# Patient Record
Sex: FEMALE | Race: BLACK OR AFRICAN AMERICAN | NOT HISPANIC OR LATINO | Employment: OTHER | URBAN - METROPOLITAN AREA
[De-identification: names, ages, dates, MRNs, and addresses within clinical notes are randomized per-mention and may not be internally consistent; named-entity substitution may affect disease eponyms.]

---

## 2023-12-14 DIAGNOSIS — M25.551 PAIN OF RIGHT HIP: Primary | ICD-10-CM

## 2023-12-15 ENCOUNTER — OFFICE VISIT (OUTPATIENT)
Dept: ORTHOPEDICS | Facility: CLINIC | Age: 73
End: 2023-12-15
Payer: MEDICARE

## 2023-12-15 ENCOUNTER — HOSPITAL ENCOUNTER (OUTPATIENT)
Dept: RADIOLOGY | Facility: HOSPITAL | Age: 73
Discharge: HOME | End: 2023-12-15
Attending: ORTHOPAEDIC SURGERY
Payer: MEDICARE

## 2023-12-15 DIAGNOSIS — M16.11 PRIMARY OSTEOARTHRITIS OF RIGHT HIP: Primary | ICD-10-CM

## 2023-12-15 DIAGNOSIS — M25.551 PAIN OF RIGHT HIP: ICD-10-CM

## 2023-12-15 PROCEDURE — 99999 PR PBB SHADOW E&M-EST. PATIENT-LVL III: ICD-10-PCS | Mod: PBBFAC,,, | Performed by: ORTHOPAEDIC SURGERY

## 2023-12-15 PROCEDURE — 1125F PR PAIN SEVERITY QUANTIFIED, PAIN PRESENT: ICD-10-PCS | Mod: CPTII,S$GLB,, | Performed by: ORTHOPAEDIC SURGERY

## 2023-12-15 PROCEDURE — 73521 XR HIPS BILATERAL 2 VIEW INCL AP PELVIS: ICD-10-PCS | Mod: 26,,, | Performed by: RADIOLOGY

## 2023-12-15 PROCEDURE — 3288F FALL RISK ASSESSMENT DOCD: CPT | Mod: CPTII,S$GLB,, | Performed by: ORTHOPAEDIC SURGERY

## 2023-12-15 PROCEDURE — 1159F PR MEDICATION LIST DOCUMENTED IN MEDICAL RECORD: ICD-10-PCS | Mod: CPTII,S$GLB,, | Performed by: ORTHOPAEDIC SURGERY

## 2023-12-15 PROCEDURE — 1101F PR PT FALLS ASSESS DOC 0-1 FALLS W/OUT INJ PAST YR: ICD-10-PCS | Mod: CPTII,S$GLB,, | Performed by: ORTHOPAEDIC SURGERY

## 2023-12-15 PROCEDURE — 73521 X-RAY EXAM HIPS BI 2 VIEWS: CPT | Mod: TC,PN

## 2023-12-15 PROCEDURE — 99999 PR PBB SHADOW E&M-EST. PATIENT-LVL III: CPT | Mod: PBBFAC,,, | Performed by: ORTHOPAEDIC SURGERY

## 2023-12-15 PROCEDURE — 3288F PR FALLS RISK ASSESSMENT DOCUMENTED: ICD-10-PCS | Mod: CPTII,S$GLB,, | Performed by: ORTHOPAEDIC SURGERY

## 2023-12-15 PROCEDURE — 1160F PR REVIEW ALL MEDS BY PRESCRIBER/CLIN PHARMACIST DOCUMENTED: ICD-10-PCS | Mod: CPTII,S$GLB,, | Performed by: ORTHOPAEDIC SURGERY

## 2023-12-15 PROCEDURE — 1101F PT FALLS ASSESS-DOCD LE1/YR: CPT | Mod: CPTII,S$GLB,, | Performed by: ORTHOPAEDIC SURGERY

## 2023-12-15 PROCEDURE — 1159F MED LIST DOCD IN RCRD: CPT | Mod: CPTII,S$GLB,, | Performed by: ORTHOPAEDIC SURGERY

## 2023-12-15 PROCEDURE — 99203 PR OFFICE/OUTPT VISIT, NEW, LEVL III, 30-44 MIN: ICD-10-PCS | Mod: S$GLB,,, | Performed by: ORTHOPAEDIC SURGERY

## 2023-12-15 PROCEDURE — 1160F RVW MEDS BY RX/DR IN RCRD: CPT | Mod: CPTII,S$GLB,, | Performed by: ORTHOPAEDIC SURGERY

## 2023-12-15 PROCEDURE — 73521 X-RAY EXAM HIPS BI 2 VIEWS: CPT | Mod: 26,,, | Performed by: RADIOLOGY

## 2023-12-15 PROCEDURE — 1125F AMNT PAIN NOTED PAIN PRSNT: CPT | Mod: CPTII,S$GLB,, | Performed by: ORTHOPAEDIC SURGERY

## 2023-12-15 PROCEDURE — 99203 OFFICE O/P NEW LOW 30 MIN: CPT | Mod: S$GLB,,, | Performed by: ORTHOPAEDIC SURGERY

## 2023-12-15 RX ORDER — ALBUTEROL SULFATE 90 UG/1
2 AEROSOL, METERED RESPIRATORY (INHALATION)
COMMUNITY
Start: 2023-10-24

## 2023-12-15 RX ORDER — AZELASTINE 1 MG/ML
1 SPRAY, METERED NASAL
COMMUNITY
Start: 2023-03-06 | End: 2024-03-05

## 2023-12-15 RX ORDER — METHYLPREDNISOLONE 4 MG/1
TABLET ORAL
Qty: 1 EACH | Refills: 1 | Status: SHIPPED | OUTPATIENT
Start: 2023-12-15

## 2023-12-15 RX ORDER — FAMOTIDINE 20 MG/1
20 TABLET, FILM COATED ORAL
COMMUNITY
Start: 2023-07-10

## 2023-12-15 RX ORDER — LEVOCETIRIZINE DIHYDROCHLORIDE 5 MG/1
10 TABLET, FILM COATED ORAL
COMMUNITY

## 2023-12-15 RX ORDER — DICLOFENAC SODIUM 75 MG/1
75 TABLET, DELAYED RELEASE ORAL 2 TIMES DAILY
COMMUNITY

## 2023-12-15 RX ORDER — ERGOCALCIFEROL 1.25 MG/1
50000 CAPSULE ORAL
COMMUNITY

## 2023-12-15 RX ORDER — IBUPROFEN 800 MG/1
800 TABLET ORAL EVERY 6 HOURS PRN
COMMUNITY

## 2023-12-15 RX ORDER — IBUPROFEN 100 MG/5ML
1000 SUSPENSION, ORAL (FINAL DOSE FORM) ORAL
COMMUNITY

## 2023-12-15 RX ORDER — ROSUVASTATIN CALCIUM 10 MG/1
10 TABLET, COATED ORAL
COMMUNITY
Start: 2023-10-09

## 2023-12-15 RX ORDER — PREGABALIN 75 MG/1
75 CAPSULE ORAL 2 TIMES DAILY
COMMUNITY

## 2023-12-15 RX ORDER — AMLODIPINE BESYLATE 5 MG/1
1 TABLET ORAL DAILY
COMMUNITY
Start: 2023-08-30 | End: 2024-08-29

## 2023-12-15 RX ORDER — ALENDRONATE SODIUM 70 MG/1
70 TABLET ORAL
COMMUNITY

## 2023-12-15 RX ORDER — MYCOPHENOLATE MOFETIL 500 MG/1
TABLET ORAL
COMMUNITY

## 2023-12-15 RX ORDER — OLMESARTAN MEDOXOMIL AND HYDROCHLOROTHIAZIDE 40/25 40; 25 MG/1; MG/1
1 TABLET ORAL
COMMUNITY

## 2023-12-15 NOTE — PROGRESS NOTES
Subjective:      Patient ID: Chinyere Najera is a 73 y.o. female.    Chief Complaint: Pain of the Right Hip and Consult    HPI      Chinyere Najera is seen for evaluation and treatment of hip pain.  They have experienced problems with their right hip over the past  several years Pain is located in the groin and  referred to the knee. They have been treated with  intra-articular injection and NSAIDS.   Symptoms have recently worsened. Ambulation reportedly has been impaired. Self care ADLs are painful.    The patient is also undergoing a workup for possible lumbar nerve root compression      The patient reports that over the years she has had multiple intra-articular injections .  The patient states that in past years injections were very beneficial, but the most recent injections done last month was not helpful.    Review of Systems   Constitutional: Negative for fever and weight loss.   HENT:  Negative for congestion.    Eyes:  Negative for visual disturbance.   Cardiovascular:  Negative for chest pain.   Respiratory:  Negative for shortness of breath.    Hematologic/Lymphatic: Negative for bleeding problem. Does not bruise/bleed easily.   Skin:  Negative for poor wound healing.   Musculoskeletal:  Positive for joint pain.   Gastrointestinal:  Negative for abdominal pain.   Genitourinary:  Negative for dysuria.   Neurological:  Negative for seizures.   Psychiatric/Behavioral:  Negative for altered mental status.    Allergic/Immunologic: Negative for persistent infections.         Objective:            Ortho/SPM Exam       Right hip     The patient is not in acute distress.   Body habitus is: avoid.   Sclerae  normal  The patient walks with a limp.   Respiratory distress:  none  The skin over the hip is:intact.   There is:no local tenderness.   Range of motion- Flexion  85, External rotation  25, internal rotation  10.  Resisted SLR positive.  Pain with rotation positive  Sciatic tension findings  negative.  Shortening/lengthening compared to the contralateral side exam deferred.  Pulses DP present, PT present.  Motor normal 5/5 strength in all tested muscle groups.   Sensory normal.    IMAGING-  right hip radiographs show near complete loss of joint space superiorly with sclerosis and cysts              Assessment:       Encounter Diagnosis   Name Primary?    Primary osteoarthritis of right hip Yes             I can not rule out the possibility that the patient has lupus placed some role in this condition were that she even might have a degree of avascular necrosis.     Although I can not rule out some degree of symptomatology from lumbar disc degeneration and nerve root compression, the patient shows compelling, objective evidence of progressive  degeneration of the right hip        Plan:       Chinyere was seen today for pain and consult.    Diagnoses and all orders for this visit:    Primary osteoarthritis of right hip           I explained my diagnostic impression and the reasoning behind it in detail, using layman's terms.  Models and/or pictures were used to help in the explanation.     Treatment options discussed included reinjection, palliation with oral corticosteroids and consideration of arthroplasty.  The pros and cons of each were discussed.      For now I recommend a Medrol Dosepak for palliation over the holiday season.      Depending on response, injection might be reconsidered.      I explained the potential role of surgery in the treatment of this condition to the patient.  They understand that if nonsurgical measures do not adequately control symptoms, surgery will be considered in the future.      I gave the patient information on our [facility] web site for patient education pertaining to hip replacement

## 2024-01-25 ENCOUNTER — TELEPHONE (OUTPATIENT)
Dept: ORTHOPEDICS | Facility: CLINIC | Age: 74
End: 2024-01-25

## 2024-01-25 ENCOUNTER — OFFICE VISIT (OUTPATIENT)
Dept: ORTHOPEDICS | Facility: CLINIC | Age: 74
End: 2024-01-25
Payer: MEDICARE

## 2024-01-25 VITALS — BODY MASS INDEX: 39.09 KG/M2 | HEIGHT: 64 IN | WEIGHT: 229 LBS

## 2024-01-25 DIAGNOSIS — M16.11 PRIMARY OSTEOARTHRITIS OF RIGHT HIP: Primary | ICD-10-CM

## 2024-01-25 PROCEDURE — 99213 OFFICE O/P EST LOW 20 MIN: CPT | Mod: S$GLB,,, | Performed by: ORTHOPAEDIC SURGERY

## 2024-01-25 PROCEDURE — 99999 PR PBB SHADOW E&M-EST. PATIENT-LVL IV: CPT | Mod: PBBFAC,,, | Performed by: ORTHOPAEDIC SURGERY

## 2024-01-25 NOTE — PROGRESS NOTES
Subjective:      Patient ID: Chinyere Najera is a 73 y.o. female.    Chief Complaint: Pain of the Right Hip    HPI    Follow-up for right hip pain.  The patient tried a Medrol Dosepak without much benefit.  She also reports that her last injection was not helpful.  She has mainly groin pain aggravated by prolonged walking.  She denies severe disability this time and is interested in trying physiotherapy          Review of Systems   Constitutional: Negative for fever and weight loss.   HENT:  Negative for congestion.    Eyes:  Negative for visual disturbance.   Cardiovascular:  Negative for chest pain.   Respiratory:  Negative for shortness of breath.    Hematologic/Lymphatic: Negative for bleeding problem. Does not bruise/bleed easily.   Skin:  Negative for poor wound healing.   Musculoskeletal:  Positive for joint pain.   Gastrointestinal:  Negative for abdominal pain.   Genitourinary:  Negative for dysuria.   Neurological:  Negative for seizures.   Psychiatric/Behavioral:  Negative for altered mental status.    Allergic/Immunologic: Negative for persistent infections.       Objective:      Ortho/SPM Exam      Right hip     The patient is not in acute distress.   Body habitus is:  Overweight  Sclerae  normal  The patient walks with a limp.   Respiratory distress:  none  The skin over the hip is:intact.   There is:no local tenderness.   Range of motion- Flexion  85, External rotation  25, internal rotation  10.  Resisted SLR positive.  Pain with rotation positive  Sciatic tension findings negative.  Shortening/lengthening compared to the contralateral side exam deferred.  Pulses DP present, PT present.  Motor normal 5/5 strength in all tested muscle groups.   Sensory normal.      Previous radiographs show advanced degeneration of the right hip joint space.  Recent lumbar MRI shows degenerative disc changes with mild lateral recess stenosis        Assessment:       Encounter Diagnosis   Name Primary?    Primary  osteoarthritis of right hip Yes        Although some of the symptomatology may be related to lumbar degenerative disc changes, overall evaluation strongly suggest that the right hip is the source of most of the patient's mobility impairment and pain with walking.          Plan:       Chinyere was seen today for pain.    Diagnoses and all orders for this visit:    Primary osteoarthritis of right hip  -     Ambulatory referral/consult to Physical/Occupational Therapy; Future          I explained my diagnostic impression and the reasoning behind it in detail, using layman's terms.  Models and/or pictures were used to help in the explanation.    Physiotherapy referral supply per patient request.      The patient plans to go on a cruise in April.  I will consider another palliative injection before she does this.    I explained the potential role of surgery in the treatment of this condition to the patient.  They understand that if nonsurgical measures do not adequately control symptoms, surgery will be considered in the future.

## 2024-03-20 ENCOUNTER — TELEPHONE (OUTPATIENT)
Dept: ORTHOPEDICS | Facility: CLINIC | Age: 74
End: 2024-03-20
Payer: MEDICARE

## 2024-03-20 NOTE — TELEPHONE ENCOUNTER
Left message to contact clinic in regards to her 03/28 appointment. MD will be out- appointment has been r/s with PA on 03/27at 11:00

## 2024-03-26 NOTE — PROGRESS NOTES
Subjective:      Patient ID: Chinyere Najera is a 73 y.o. female.    Chief Complaint: Follow-up of the Right Hip      HPI: Chinyere Najera is a 73 y.o. female who presents to clinic for follow up of ongoing right hip pain related to osteoarthritis.  The patient notes continued groin pain and difficulties with ambulation.  She would like to discuss prescription of an assist device today.  Additionally, patient would like to explore repeat palliative hip injection as previously discussed.  She denies any new injuries or symptoms.  She is having increasing difficulties with ADLs.    01/25/2024 PRAVIN FU: Follow-up for right hip pain.  The patient tried a Medrol Dosepak without much benefit.  She also reports that her last injection was not helpful.  She has mainly groin pain aggravated by prolonged walking.  She denies severe disability this time and is interested in trying physiotherapy  The patient plans to go on a cruise in April. I will consider another palliative injection before she does this.       PAST MEDICAL HISTORY:    Past Medical History:   Diagnosis Date    Arthritis     GERD (gastroesophageal reflux disease)     HTN (hypertension)     Lupus     Vertigo      MEDICATIONS:   Current Outpatient Medications:     albuterol (PROVENTIL/VENTOLIN HFA) 90 mcg/actuation inhaler, Inhale 2 puffs into the lungs., Disp: , Rfl:     alendronate (FOSAMAX) 70 MG tablet, Take 70 mg by mouth every 7 days., Disp: , Rfl:     amLODIPine (NORVASC) 5 MG tablet, Take 1 tablet by mouth once daily., Disp: , Rfl:     ascorbic acid, vitamin C, (VITAMIN C) 1000 MG tablet, Take 1,000 mg by mouth., Disp: , Rfl:     aspirin (ECOTRIN) 81 MG EC tablet, Take 81 mg by mouth once daily., Disp: , Rfl:     azelastine (ASTELIN) 137 mcg (0.1 %) nasal spray, 1 spray by Nasal route., Disp: , Rfl:     azilsartan med-chlorthalidone (EDARBYCLOR) 40-12.5 mg Tab, Take by mouth once daily., Disp: , Rfl:     calcium-vitamin D3 (CALCIUM 500 + D) 500 mg(1,250mg) -200  unit per tablet, Take by mouth once daily., Disp: , Rfl:     diclofenac (VOLTAREN) 75 MG EC tablet, Take 75 mg by mouth 2 (two) times daily., Disp: , Rfl:     ergocalciferol (ERGOCALCIFEROL) 50,000 unit Cap, Take 50,000 Units by mouth., Disp: , Rfl:     esomeprazole (NEXIUM) 40 MG capsule, Take 40 mg by mouth before breakfast., Disp: , Rfl:     famotidine (PEPCID) 20 MG tablet, Take 20 mg by mouth., Disp: , Rfl:     fexofenadine (ALLEGRA) 180 MG tablet, Take 180 mg by mouth once daily., Disp: , Rfl:     ibuprofen (ADVIL,MOTRIN) 800 MG tablet, Take 800 mg by mouth every 6 (six) hours as needed for Pain., Disp: , Rfl:     levocetirizine (XYZAL) 5 MG tablet, Take 10 mg by mouth., Disp: , Rfl:     methylPREDNISolone (MEDROL DOSEPACK) 4 mg tablet, use as directed, Disp: 1 each, Rfl: 1    mycophenolate (CELLCEPT) 500 mg Tab, Take by mouth., Disp: , Rfl:     olmesartan-hydrochlorothiazide (BENICAR HCT) 40-25 mg per tablet, Take 1 tablet by mouth., Disp: , Rfl:     potassium chloride (MICRO-K) 10 MEQ CpSR, Take 10 mEq by mouth once daily., Disp: , Rfl:     pregabalin (LYRICA) 75 MG capsule, Take 75 mg by mouth 2 (two) times daily., Disp: , Rfl:     rosuvastatin (CRESTOR) 10 MG tablet, Take 10 mg by mouth., Disp: , Rfl:     ALLERGIES:   Review of patient's allergies indicates:  No Known Allergies    Review of Systems:  Constitution: Negative for chills, fever and night sweats.   HENT: Negative for congestion and headaches.    Eyes: Negative for blurred vision or vision loss.  Cardiovascular: Negative for chest pain and syncope.   Respiratory: Negative for cough and shortness of breath.    Endocrine: Negative for polydipsia, polyphagia and polyuria.   Hematologic/Lymphatic: Negative for bleeding problem. Does not bruise/bleed easily.   Skin: Negative for dry skin, itching and rash.   Musculoskeletal: See HPI.   Gastrointestinal: Negative for abdominal pain and bowel incontinence.   Genitourinary: Negative for bladder  incontinence and nocturia.   Neurological: Negative for disturbances in coordination, loss of balance and seizures.   Psychiatric/Behavioral: Negative for depression. The patient does not have insomnia.    Allergic/Immunologic: Negative for hives and persistent infections.          Objective:      There were no vitals filed for this visit.    PHYSICAL EXAM:  General: Alert & oriented x3, well-developed and well-nourished, in no acute distress, sitting comfortably in the exam room.    RIGHT HIP:        Body habitus is:   overweight .         The patient walks with a limp.         The skin over the hip is intact.         Tenderness:  no local tenderness.        Range of Motion:    Flexion:  85°  External Rotation:  25°  Internal Rotation:  10°        Pain with rotation:  positive        Resisted SLR:  positive.        Sciatic tension findings:  negative.        Pulses DP present, PT present.        Motor:  normal 5/5 strength in all tested muscle groups.         Sensory:  normal.      Imaging:   X-Rays: 3 views of bilateral hips dated 12/15/2023 , and independently reviewed, show: Advanced degenerative changes of the right hip joint space.       MRI Lumbar Spine (dated: 12/20/2023):   Disc bulge with degenerative changes at all lumbar vertebrae.  Spondylotic changes lumbar spine greatest at L4-L5.        Assessment:       1. Primary osteoarthritis of right hip         Plan:       Orders Placed This Encounter    CANE FOR HOME USE    Ambulatory referral/consult to Pain Clinic     The patient's right hip pain continues to worsen despite conservative treatment with anti-inflammatories, physiotherapy and activity modification.    We again reviewed the anatomy and pathophysiology of arthritis, which is likely to continue to worsen over time.    Pain control:  Order for repeat hip injection with interventional pain management prior to scheduled cruise in a few weeks    DME: cane for ambulation assistance    Follow-up: 3  months    We again reviewed the surgical procedure of total hip arthroplasty, and I was able to answer several questions at the bedside and present hip models for review.  Patient will consider total hip arthroplasty in the future.

## 2024-03-27 ENCOUNTER — OFFICE VISIT (OUTPATIENT)
Dept: ORTHOPEDICS | Facility: CLINIC | Age: 74
End: 2024-03-27
Payer: MEDICARE

## 2024-03-27 VITALS — HEIGHT: 64 IN | WEIGHT: 229.25 LBS | BODY MASS INDEX: 39.14 KG/M2

## 2024-03-27 DIAGNOSIS — M16.11 PRIMARY OSTEOARTHRITIS OF RIGHT HIP: Primary | ICD-10-CM

## 2024-03-27 PROCEDURE — 99999 PR PBB SHADOW E&M-EST. PATIENT-LVL IV: CPT | Mod: PBBFAC,,, | Performed by: PHYSICIAN ASSISTANT

## 2024-03-27 PROCEDURE — 99213 OFFICE O/P EST LOW 20 MIN: CPT | Mod: S$GLB,,, | Performed by: PHYSICIAN ASSISTANT

## 2024-03-28 ENCOUNTER — TELEPHONE (OUTPATIENT)
Dept: ORTHOPEDICS | Facility: CLINIC | Age: 74
End: 2024-03-28
Payer: MEDICARE

## 2024-03-28 ENCOUNTER — TELEPHONE (OUTPATIENT)
Dept: PAIN MEDICINE | Facility: CLINIC | Age: 74
End: 2024-03-28
Payer: MEDICARE

## 2024-03-28 NOTE — TELEPHONE ENCOUNTER
----- Message from Conchita Kumar sent at 3/28/2024 11:10 AM CDT -----  Pt Requesting Call Back    Who called: pt  Who called for pt:  Best call back #:  [phone]  Add notes: pt said someone reached out to her from the pain management dept and that she does not need to see anyone in that department; pt needs clarity on why she was referred there

## 2024-03-28 NOTE — TELEPHONE ENCOUNTER
Spoke with pt. Pt agreed on date and time for a new pt visit with Jen. No further concerns was expressed. Call ended.----- Message from Tana De sent at 3/28/2024 12:38 PM CDT -----  Regarding: pt returning call  Name of Who is Calling: VIJAY CONNER [[id number]]      What is the request in detail: pt stated she received a call back from a Narcisa to be scheduled for her IR injection. Referral is in the system . Please advise       Can the clinic reply by [facility]: No       [facility] Telephone Information:           [phone]

## 2024-04-02 ENCOUNTER — TELEPHONE (OUTPATIENT)
Dept: PAIN MEDICINE | Facility: CLINIC | Age: 74
End: 2024-04-02
Payer: MEDICARE

## 2024-04-02 ENCOUNTER — OFFICE VISIT (OUTPATIENT)
Dept: PAIN MEDICINE | Facility: CLINIC | Age: 74
End: 2024-04-02
Payer: MEDICARE

## 2024-04-02 VITALS
WEIGHT: 229.38 LBS | BODY MASS INDEX: 39.16 KG/M2 | DIASTOLIC BLOOD PRESSURE: 81 MMHG | HEART RATE: 72 BPM | HEIGHT: 64 IN | SYSTOLIC BLOOD PRESSURE: 138 MMHG

## 2024-04-02 DIAGNOSIS — M25.551 RIGHT HIP PAIN: ICD-10-CM

## 2024-04-02 DIAGNOSIS — M16.11 PRIMARY OSTEOARTHRITIS OF RIGHT HIP: Primary | ICD-10-CM

## 2024-04-02 DIAGNOSIS — M47.816 LUMBAR SPONDYLOSIS: ICD-10-CM

## 2024-04-02 DIAGNOSIS — M51.36 DDD (DEGENERATIVE DISC DISEASE), LUMBAR: ICD-10-CM

## 2024-04-02 PROCEDURE — 3075F SYST BP GE 130 - 139MM HG: CPT | Mod: CPTII,S$GLB,, | Performed by: STUDENT IN AN ORGANIZED HEALTH CARE EDUCATION/TRAINING PROGRAM

## 2024-04-02 PROCEDURE — 99999 PR PBB SHADOW E&M-EST. PATIENT-LVL IV: CPT | Mod: PBBFAC,,, | Performed by: STUDENT IN AN ORGANIZED HEALTH CARE EDUCATION/TRAINING PROGRAM

## 2024-04-02 PROCEDURE — 1159F MED LIST DOCD IN RCRD: CPT | Mod: CPTII,S$GLB,, | Performed by: STUDENT IN AN ORGANIZED HEALTH CARE EDUCATION/TRAINING PROGRAM

## 2024-04-02 PROCEDURE — 1101F PT FALLS ASSESS-DOCD LE1/YR: CPT | Mod: CPTII,S$GLB,, | Performed by: STUDENT IN AN ORGANIZED HEALTH CARE EDUCATION/TRAINING PROGRAM

## 2024-04-02 PROCEDURE — 3079F DIAST BP 80-89 MM HG: CPT | Mod: CPTII,S$GLB,, | Performed by: STUDENT IN AN ORGANIZED HEALTH CARE EDUCATION/TRAINING PROGRAM

## 2024-04-02 PROCEDURE — 99204 OFFICE O/P NEW MOD 45 MIN: CPT | Mod: S$GLB,,, | Performed by: STUDENT IN AN ORGANIZED HEALTH CARE EDUCATION/TRAINING PROGRAM

## 2024-04-02 PROCEDURE — 3288F FALL RISK ASSESSMENT DOCD: CPT | Mod: CPTII,S$GLB,, | Performed by: STUDENT IN AN ORGANIZED HEALTH CARE EDUCATION/TRAINING PROGRAM

## 2024-04-02 PROCEDURE — 1125F AMNT PAIN NOTED PAIN PRSNT: CPT | Mod: CPTII,S$GLB,, | Performed by: STUDENT IN AN ORGANIZED HEALTH CARE EDUCATION/TRAINING PROGRAM

## 2024-04-02 PROCEDURE — 3008F BODY MASS INDEX DOCD: CPT | Mod: CPTII,S$GLB,, | Performed by: STUDENT IN AN ORGANIZED HEALTH CARE EDUCATION/TRAINING PROGRAM

## 2024-04-02 NOTE — TELEPHONE ENCOUNTER
----- Message from Rojelio Li DO sent at 2024 11:27 AM CDT -----  Regarding: Order for VIJAY CONNER    Patient Name: VIJAY CONNER([id number])  Sex: Female  : 1950      PCP: TRACIE, PRIMARY DOCTOR    Center: [facility]     Types of orders made on 2024: Outpatient Referral, Procedure Request    Order Date:2024  Ordering User:ROJELIO LI [[id number]]  Encounter Provider:Rojelio Li DO   [[id number]  Authorizing Provider: Rojelio Li DO [[id number]]  Department:[facility] PAIN MANAGEMENT[[id number]]    Common Order Information  Procedure -> Joint/Bursa Injection (Specify joint and laterality) Cmt: Right             Intra-articular Hip Injection    Pre-op Diagnosis -> OA (osteoarthritis) of hip       -> Hip pain     Order Specific Information  Order: Procedure Order to Pain Management [Custom: [id number]]  Order #:            [id number]: 1 FUTURE    Priority: Routine  Class: Clinic Performed    Future Order Information      Expires on:2025            Expected by:2024                   Comment:Please schedule     Associated Diagnoses      M16.11 Primary osteoarthritis of right hip      M25.551 Right hip pain      Physician -> Jen         Facility Name: -> [place]         Follow-up: -> 2 weeks Cmt:   Tyler Raygoza          Priority: Routine  Class: Clinic Performed    Future Order Information      Expires on:2025            Expected by:2024                   Comment:Please schedule     Associated Diagnoses      M16.11 Primary osteoarthritis of right hip      M25.551 Right hip pain      Procedure -> Joint/Bursa Injection (Specify joint and laterality) Cmt:                 Right Intra-articula  r Hip Injection        Physician -> Jen         Pre-op Diagnosis -> OA (osteoarthritis) of hip           -> Hip pain         Facility Name: -> [facility]         Follow-up: -> 2 weeks Cmt: Tyler Raygoza

## 2024-04-02 NOTE — H&P (VIEW-ONLY)
[facility] - New Patient Evaluation    Referred by: Audrey Hammond   Reason for referral: Primary osteoarthritis of right hip     CC:   Chief Complaint   Patient presents with    Hip Pain    Leg Pain         4/2/2024    11:05 AM   Last 3 PDI Scores   Pain Disability Index (PDI) 42       Subjective 04/02/24  Chinyere Najera is a 73 y.o. female who presents complaining of right hip pain.  The pain radiates to the groin into the right knee.  Pain is worse with standing, walking and daily activities.  She was previously undergone intra-articular right hip injections with excellent relief ranging anywhere from 8 months to 18 months.  She would like to repeat a hip injection.     Location: right hip pain   Onset: years, worse over last 6 months  Current Pain Score: 9/10  Daily Pain of Range: 9-10/10  Quality: Aching, Throbbing, Deep, and Sharp  Radiation:  Right groin and right knee  Worsened by: lying down, sitting, standing for more than a few minutes, walking for more than a few minutes, and daily activity.  Improved by: nothing    Patient denies night fever/night sweats, urinary incontinence, bowel incontinence, significant weight loss, significant motor weakness, and loss of sensations.    Previous Interventions:  - several right intra-articular hip injections with Orthopedics with relief ranging from 8-18 months    Previous Therapies:  PT/OT: yes   Chiropractor:   HEP:  Yes - physical therapy twice weekly and aquatic exercise up to 4 times weekly  Relevant Surgery: no   Previous Medications:   - NSAIDS:  Diclofenac  - Muscle Relaxants:    - TCAs:   - SNRIs:   - Topicals:   - Anticonvulsants:  Lyrica   - Opioids:   - Adjuvants:  Medrol Dosepak    Current Pain Medications:       Review of Systems:  ROS    GENERAL:  No weight loss, malaise or fevers.  HEENT:   No recent changes in vision or hearing  NECK:  No difficulty with swallowing. No stridor.   RESPIRATORY:  Negative for cough, wheezing or  shortness of breath, patient denies any recent URI.  CARDIOVASCULAR:  Negative for chest pain, leg swelling or palpitations.  GI:  Negative for abdominal discomfort, blood in stools or black stools or change in bowel habits.  MUSCULOSKELETAL:  See HPI.  SKIN:  Negative for lesions, rash, and itching.  PSYCH:  No mood disorder or recent psychosocial stressors.    HEMATOLOGY/LYMPHOLOGY:  Negative for prolonged bleeding, bruising easily or swollen nodes.  Patient is not currently taking any anti-coagulants  NEURO:   No history of headaches, syncope, paralysis, seizures or tremors.  All other reviewed and negative other than HPI.    History:  Current medications, allergies, medical history, surgical history,   family history, and social history were reviewed in the chart as marked.    Full Medication List:    Current Outpatient Medications:     albuterol (PROVENTIL/VENTOLIN HFA) 90 mcg/actuation inhaler, Inhale 2 puffs into the lungs., Disp: , Rfl:     alendronate (FOSAMAX) 70 MG tablet, Take 70 mg by mouth every 7 days., Disp: , Rfl:     amLODIPine (NORVASC) 5 MG tablet, Take 1 tablet by mouth once daily., Disp: , Rfl:     ascorbic acid, vitamin C, (VITAMIN C) 1000 MG tablet, Take 1,000 mg by mouth., Disp: , Rfl:     aspirin (ECOTRIN) 81 MG EC tablet, Take 81 mg by mouth once daily., Disp: , Rfl:     azilsartan med-chlorthalidone (EDARBYCLOR) 40-12.5 mg Tab, Take by mouth once daily., Disp: , Rfl:     calcium-vitamin D3 (CALCIUM 500 + D) 500 mg(1,250mg) -200 unit per tablet, Take by mouth once daily., Disp: , Rfl:     diclofenac (VOLTAREN) 75 MG EC tablet, Take 75 mg by mouth 2 (two) times daily., Disp: , Rfl:     ergocalciferol (ERGOCALCIFEROL) 50,000 unit Cap, Take 50,000 Units by mouth., Disp: , Rfl:     esomeprazole (NEXIUM) 40 MG capsule, Take 40 mg by mouth before breakfast., Disp: , Rfl:     famotidine (PEPCID) 20 MG tablet, Take 20 mg by mouth., Disp: , Rfl:     fexofenadine (ALLEGRA) 180 MG tablet, Take 180 mg  "by mouth once daily., Disp: , Rfl:     ibuprofen (ADVIL,MOTRIN) 800 MG tablet, Take 800 mg by mouth every 6 (six) hours as needed for Pain., Disp: , Rfl:     levocetirizine (XYZAL) 5 MG tablet, Take 10 mg by mouth., Disp: , Rfl:     methylPREDNISolone (MEDROL DOSEPACK) 4 mg tablet, use as directed, Disp: 1 each, Rfl: 1    mycophenolate (CELLCEPT) 500 mg Tab, Take by mouth., Disp: , Rfl:     olmesartan-hydrochlorothiazide (BENICAR HCT) 40-25 mg per tablet, Take 1 tablet by mouth., Disp: , Rfl:     potassium chloride (MICRO-K) 10 MEQ CpSR, Take 10 mEq by mouth once daily., Disp: , Rfl:     pregabalin (LYRICA) 75 MG capsule, Take 75 mg by mouth 2 (two) times daily., Disp: , Rfl:     rosuvastatin (CRESTOR) 10 MG tablet, Take 10 mg by mouth., Disp: , Rfl:     azelastine (ASTELIN) 137 mcg (0.1 %) nasal spray, 1 spray by Nasal route., Disp: , Rfl:      Allergies:  Patient has no known allergies.     Medical History:   has a past medical history of Arthritis, GERD (gastroesophageal reflux disease), HTN (hypertension), Lupus, and Vertigo.    Surgical History:   has a past surgical history that includes Partial hysterectomy and Cholecystectomy.    Family History:  family history is not on file.    Social History:   reports that she has never smoked. She does not have any smokeless tobacco history on file. She reports current alcohol use. She reports that she does not use drugs.    Physical Exam:  Vitals:    04/02/24 1104   BP: 138/81   Pulse: 72   Weight: 104.1 kg (229 lb 6.2 oz)   Height: 5' 4" (1.626 m)   PainSc:   9   PainLoc: Hip       GENERAL: Well appearing, in no acute distress, alert and oriented x3.  PSYCH:  Mood and affect appropriate.  SKIN: Skin color, texture, turgor normal, no rashes or lesions.  HEAD/FACE:  Normocephalic, atraumatic. Cranial nerves grossly intact.  NECK: Normal ROM. Supple.   CV: RRR with palpation of the radial artery.  PULM: No evidence of respiratory difficulty, symmetric chest rise.  GI:  " Soft and non-distended.  MSK: Straight leg raising is Positive on the Right to radicular pain. + pain to palpation over the facet joints of the lumbar spine. + pain with lumbar facet loading.  LAMAR test is positive for right hip pain. Pain with ROM of the Right Hip. Mild pain over the GBT bilaterally. No obvious deformities, edema, or skin discoloration.  No atrophy or tone abnormalities are noted.   NEURO: Bilateral upper and lower extremity coordination and strength is symmetric.  No loss of sensation is noted.  MENTAL STATUS: A x O x 3, good concentration, speech is fluent and goal directed  MOTOR: 5/5 in bilateral lower extremity muscle groups  GAIT:  Antalgic.  Ambulates unassisted.    Imaging:  EXAMINATION: MRI lumbar spine without contrast     EXAMINATION DATE: 12/20/2023     COMPARISON: None     TECHNIQUE: Multiplanar multisequence images of the lumbar spine were obtained without the use contrast.     INDICATION: Pain in both arms     FINDINGS:     5 lumbar bodies. The lordotic curvature is normal. Grade 1 anterolisthesis of L4 and L5 without evidence for spondylolysis. Vertebral heights are maintained. Marrow signal pattern is normal. No evidence for compression informed, fracture, dislocation. Intervertebral disc space narrowing is identified at L4-L5 and L5-S1 with diffuse disc desiccation. Conus medullaris at the level of L1. The proximal spinal cord is normal caliber. The distal nerve roots are normal.     T12-L1: Small broad-based disc bulge with ligament flavum hypertrophy and degenerative changes facets. No evidence for central canal stenosis or neural foraminal narrowing.     L1-L2: Broad-based disc bulge with ligament flavum hypertrophy and degenerative changes facets. No evidence for central canal stenosis or neural foraminal narrowing.     L2-L3: Broad-based disc bulge with ligament flavum approach degenerative changes facets. Encroachment upon the left lateral recess is identified with  impingement transcending L3 nerve root. No significant neural foraminal narrowing or central canal stenosis.     L3-L4: Broad-based disc bulge with ligament flavum hypertrophy and degenerative changes facets. Involvement of bilateral lateral recesses greater on the left right with impingement transcending L4 nerve roots. No evidence for central canal stenosis. Minimal neural foraminal narrowing on the right.     L4-L5: Exaggerated broad-based disc bulge with ligament flavum hypertrophy and degenerative changes facets causing minimal moderate central canal stenosis. Involvement of the bilateral lateral recesses with impingement of the transcending L5 nerve roots. Moderate bilateral neural foraminal narrowing.     L5-S1: Central disc protrusion with effacement of ventral CSF. No evidence for central canal stenosis. Involvement of bilateral lateral recesses with impingement of the transcending S1 nerve roots. Minimal neural foraminal narrowing.     Degenerative changes of the SI joints. The visualized hollow and solid viscera grossly normal         XR HIPS BILATERAL 2 VIEW INCL AP PELVIS     CLINICAL HISTORY:  Pain in right hip     FINDINGS:  Bilateral two views hips to include pelvis.     Right: No fracture dislocation bone destruction seen.     Left: No fracture dislocation bone destruction seen.        Electronically signed by: Oziel Aranda MD  Date:                                            12/15/2023    Labs:  BMP  Lab Results   Component Value Date     09/01/2016    K 3.8 09/01/2016     09/01/2016    CO2 26 09/01/2016    BUN 13 09/01/2016    CREATININE 0.83 09/01/2016    CALCIUM 9.6 09/01/2016     Lab Results   Component Value Date    ALT 13 09/01/2016    AST 19 09/01/2016    ALKPHOS 126 09/01/2016    BILITOT 0.4 09/01/2016     Lab Results   Component Value Date    WBC 6.8 09/01/2016    HGB 12.0 09/01/2016    HCT 38.5 09/01/2016    MCV 82.2 09/01/2016     09/01/2016          Assessment:  Problem List Items Addressed This Visit    None  Visit Diagnoses       Primary osteoarthritis of right hip    -  Primary    Relevant Orders    Procedure Order to Pain Management    Right hip pain        Relevant Orders    Procedure Order to Pain Management    DDD (degenerative disc disease), lumbar        Lumbar spondylosis                04/02/2024 - Chinyere Najera is a 73 y.o. female who  has a past medical history of Arthritis, GERD (gastroesophageal reflux disease), HTN (hypertension), Lupus, and Vertigo.  By history and examination this patient has chronic right hip pain as well as low back pain with radiculopathy.  The underlying cause is hip osteoarthritis, degenerative disc disease and foraminal stenosis.  By history and exam of the bulk of her pain appears to be from the hip joint itself however I do feel like there is some contribution from her low back.  Lumbar MRI and hip x-rays were reviewed.  We discussed the underlying diagnoses and multiple treatment options including non-opioid medications, interventional procedures, physical therapy, home exercise, core muscle enhancement, and weight loss.  I will schedule the patient for a right intra-articular hip injection.  In the future she may also benefit from a lumbar epidural steroid injection as I feel this is also contributing to her pain.  The risks and benefits of each treatment option were discussed and all questions were answered.      Treatment Plan:   Procedures:  Scheduled for right intra-articular hip injection.  Consider epidural steroid injection in the future for radicular symptoms  PT/OT/HEP: I have stressed the importance of physical activity and a home exercise plan to help with pain and improve health.  Continue with home exercise regimen and physical therapy  Medications:    - no new medications were prescribed at this visit.   -  Reviewed and consistent with medication use as prescribed.  Imaging:  MRI lumbar spine  and hip x-rays were reviewed.  Follow Up: RTC 2 weeks postprocedure    Laura Li DO   Interventional Pain Medicine / Anesthesiology    Disclaimer: This note was partly generated using dictation software which may occasionally result in transcription errors.

## 2024-04-10 ENCOUNTER — TELEPHONE (OUTPATIENT)
Dept: PAIN MEDICINE | Facility: CLINIC | Age: 74
End: 2024-04-10
Payer: MEDICARE

## 2024-04-15 NOTE — PRE-PROCEDURE INSTRUCTIONS
Patient reviewed on 4/15/2024.  Okay to proceed at [place]. The following pre-procedure instructions and arrival time have been reviewed with patient via phone.  Patient verbalized an understanding.  Pt to be accompanied by Miley Cash day of procedure as responsible .    Procedure with Dr. Li on 4/17/2024.  Your scheduled arrival time is 2 pm.  This arrival time is roughly 1 hour before your anticipated procedure time to allow sufficient time for pre-op..  Please wear comfortable clothes. You will be placed in a gown for your procedure.  Please do not wear a dress.  This procedure will take place at the [facility] at the corner of [facility].  It is in the [place] next to [facility].  The address is:    [address] [place] [address].  [place] [address]    After entering the building, you will proceed to the second floor where you can check in with registration. You should take any medications that you routinely take for blood pressure, heart medications, thyroid, cholesterol, etc.     The fasting restrictions are dependent on whether or not you are receiving sedation.  Sedation is not available for all procedures.     Your fasting instructions are as follow:  Oral Sedation. You do not need to fast before this procedure.  You can eat and drink like normal.  You CANNOT drive yourself and must have a .    If you are on blood thinners, you need to follow the anticoagulation instructions that had been discussed previously.  You should only stop the blood thinners if it was approved by your primary care physician or your cardiologist.  In the event that you are not able to stop your blood thinners, a blood thinner was not listed on your medication list, or we were not able to get clearance from your cardiologist, then the procedure may have to be postponed/canceled.     IF you were told to stop your blood thinners, this is how long you should  generally hold some of the more common ones.  Remember that stopping blood thinners is only necessary for certain procedures. If you are unsure of your instructions, please call us.   Aspirin - 5 days  Plavix/Clopidogrel - 7 days  Warfarin / Coumadin - 5 days  Eliquis - 3 days  Pradaxa/Dabigatran - 4 days  Xarelto/Rivaroxaban - 3 days    If you are a diabetic, do not take your medication if you will be fasting, but bring it with you. Please plan on being here for roughly 3 hours.     Please call us if you have been sick (running fever, having any flu-like symptoms) or have been taking antibiotics in the past 2 weeks or had any outpatient procedures other than with us (colonoscopy, endoscopy, OBGYN, dental, etc.). If you have been previously COVID positive, you will need to hold off on your procedure until you are symptom free for 10 days. If you did not have any symptoms, you can have your procedure 10 days from your positive test result.      *HOLD ALL VITAMINS, MINERALS, HERBS (INCLUDING HERBAL TEAS) AND SUPPLEMENTS  *SHOWER WITH ANTIBACTERIAL SOAP (EX. DIAL) NIGHT BEFORE AND MORNING OF PROCEDURE  *DO NOT APPLY ANY LOTIONS, OILS, POWDERS, PERFUME/COLOGNE, OINTMENTS, GELS, CREAMS, MAKEUP OR DEODORANT TO YOUR SKIN MORNING OF PROCEDURE  *LEAVE JEWELRY AND ANY VALUABLES AT HOME  *WEAR LOOSE COMFORTABLE CLOTHING (PREFERABLY A BUTTON UP SHIRT)    Please reply to this message as receipt of delivery.    Thank you,  [facility]  [place], LPN [facility]  Pre-Admit

## 2024-04-17 ENCOUNTER — HOSPITAL ENCOUNTER (OUTPATIENT)
Facility: HOSPITAL | Age: 74
Discharge: HOME | End: 2024-04-17
Attending: STUDENT IN AN ORGANIZED HEALTH CARE EDUCATION/TRAINING PROGRAM | Admitting: STUDENT IN AN ORGANIZED HEALTH CARE EDUCATION/TRAINING PROGRAM
Payer: MEDICARE

## 2024-04-17 VITALS
HEART RATE: 78 BPM | SYSTOLIC BLOOD PRESSURE: 176 MMHG | RESPIRATION RATE: 18 BRPM | HEIGHT: 65 IN | DIASTOLIC BLOOD PRESSURE: 67 MMHG | TEMPERATURE: 98 F | WEIGHT: 220 LBS | OXYGEN SATURATION: 100 % | BODY MASS INDEX: 36.65 KG/M2

## 2024-04-17 DIAGNOSIS — M16.11 OSTEOARTHRITIS OF RIGHT HIP, UNSPECIFIED OSTEOARTHRITIS TYPE: ICD-10-CM

## 2024-04-17 DIAGNOSIS — M25.551 PAIN OF RIGHT HIP: Primary | ICD-10-CM

## 2024-04-17 DIAGNOSIS — G89.29 CHRONIC PAIN: ICD-10-CM

## 2024-04-17 PROCEDURE — 20610 DRAIN/INJ JOINT/BURSA W/O US: CPT | Mod: RT,,, | Performed by: STUDENT IN AN ORGANIZED HEALTH CARE EDUCATION/TRAINING PROGRAM

## 2024-04-17 PROCEDURE — 20610 DRAIN/INJ JOINT/BURSA W/O US: CPT | Mod: RT | Performed by: STUDENT IN AN ORGANIZED HEALTH CARE EDUCATION/TRAINING PROGRAM

## 2024-04-17 PROCEDURE — 77002 NEEDLE LOCALIZATION BY XRAY: CPT | Mod: 26,,, | Performed by: STUDENT IN AN ORGANIZED HEALTH CARE EDUCATION/TRAINING PROGRAM

## 2024-04-17 PROCEDURE — 63600175 PHARM REV CODE 636 W HCPCS: Performed by: STUDENT IN AN ORGANIZED HEALTH CARE EDUCATION/TRAINING PROGRAM

## 2024-04-17 PROCEDURE — 25000003 PHARM REV CODE 250: Performed by: STUDENT IN AN ORGANIZED HEALTH CARE EDUCATION/TRAINING PROGRAM

## 2024-04-17 PROCEDURE — 25500020 PHARM REV CODE 255: Performed by: STUDENT IN AN ORGANIZED HEALTH CARE EDUCATION/TRAINING PROGRAM

## 2024-04-17 RX ORDER — TRIAMCINOLONE ACETONIDE 40 MG/ML
INJECTION, SUSPENSION INTRA-ARTICULAR; INTRAMUSCULAR
Status: DISCONTINUED | OUTPATIENT
Start: 2024-04-17 | End: 2024-04-17 | Stop reason: HOSPADM

## 2024-04-17 RX ORDER — BUPIVACAINE HYDROCHLORIDE 2.5 MG/ML
INJECTION, SOLUTION EPIDURAL; INFILTRATION; INTRACAUDAL
Status: DISCONTINUED | OUTPATIENT
Start: 2024-04-17 | End: 2024-04-17 | Stop reason: HOSPADM

## 2024-04-17 RX ORDER — LIDOCAINE HYDROCHLORIDE 20 MG/ML
INJECTION, SOLUTION EPIDURAL; INFILTRATION; INTRACAUDAL; PERINEURAL
Status: DISCONTINUED | OUTPATIENT
Start: 2024-04-17 | End: 2024-04-17 | Stop reason: HOSPADM

## 2024-04-17 RX ORDER — ALPRAZOLAM 0.5 MG/1
0.5 TABLET, ORALLY DISINTEGRATING ORAL
Status: DISCONTINUED | OUTPATIENT
Start: 2024-04-17 | End: 2024-04-17 | Stop reason: HOSPADM

## 2024-04-17 RX ADMIN — ALPRAZOLAM 0.5 MG: 0.5 TABLET, ORALLY DISINTEGRATING ORAL at 02:04

## 2024-04-17 NOTE — DISCHARGE INSTRUCTIONS
Home Care Instructions Pain Management:    1.  DIET:    You may resume your normal diet today.    2.  BATHING:    You may shower with luke warm water.    3.  DRESSING:    You may remove your bandage today.    4.  ACTIVITY LEVEL:      You may resume your normal activities 24 hours after your procedure.    5.  MEDICATIONS:    You may resume your normal medications today.    6.  SPECIAL INSTRUCTIONS:    No heat to the injection site for 24 hours including bath or shower, heating pad, moist heat or hot tubs.    Use an ice pack to the injection site for any pain or discomfort.  Apply ice packs for 20 minute intervals as needed.    If you have received any sedatives by mouth today, you can not drive for 12 hours.    If you have received sedation through an IV, you can not drive for 24 hours.    PLEASE CALL YOUR DOCTOR FOR THE FOLLOWIN.  Redness or swelling around the injection site.  2.  Fever of 101 degrees.  3.  Drainage (pus) from the injection site.  4.  For any continuous bleeding (some dried blood over the incision is normal.)    FOR EMERGENCIES:    If any unusual problems or difficulties occur during clinic hours, call [phone] or dial [phone].    Follow up with with your physician in 2-3 weeks.

## 2024-04-17 NOTE — DISCHARGE SUMMARY
Discharge Note  Short Stay      SUMMARY     Admit Date: 4/17/2024    Attending Physician: Laura Li      Discharge Physician: Laura Li      Discharge Date: 4/17/2024 2:25 PM    Procedure(s) (LRB):  Right Intra-articular Hip Injection (Right)    Final Diagnosis: Primary osteoarthritis of right hip [M16.11]    Disposition: Home or self care    Patient Instructions:   Current Discharge Medication List        CONTINUE these medications which have NOT CHANGED    Details   alendronate (FOSAMAX) 70 MG tablet Take 70 mg by mouth every 7 days.      amLODIPine (NORVASC) 5 MG tablet Take 1 tablet by mouth once daily.      calcium-vitamin D3 (CALCIUM 500 + D) 500 mg(1,250mg) -200 unit per tablet Take by mouth once daily.      ergocalciferol (ERGOCALCIFEROL) 50,000 unit Cap Take 50,000 Units by mouth.      famotidine (PEPCID) 20 MG tablet Take 20 mg by mouth.      mycophenolate (CELLCEPT) 500 mg Tab Take by mouth.      olmesartan-hydrochlorothiazide (BENICAR HCT) 40-25 mg per tablet Take 1 tablet by mouth.      rosuvastatin (CRESTOR) 10 MG tablet Take 10 mg by mouth.      albuterol (PROVENTIL/VENTOLIN HFA) 90 mcg/actuation inhaler Inhale 2 puffs into the lungs.      ascorbic acid, vitamin C, (VITAMIN C) 1000 MG tablet Take 1,000 mg by mouth.      aspirin (ECOTRIN) 81 MG EC tablet Take 81 mg by mouth once daily.      azelastine (ASTELIN) 137 mcg (0.1 %) nasal spray 1 spray by Nasal route.      azilsartan med-chlorthalidone (EDARBYCLOR) 40-12.5 mg Tab Take by mouth once daily.      diclofenac (VOLTAREN) 75 MG EC tablet Take 75 mg by mouth 2 (two) times daily.      esomeprazole (NEXIUM) 40 MG capsule Take 40 mg by mouth before breakfast.      fexofenadine (ALLEGRA) 180 MG tablet Take 180 mg by mouth once daily.      ibuprofen (ADVIL,MOTRIN) 800 MG tablet Take 800 mg by mouth every 6 (six) hours as needed for Pain.      levocetirizine (XYZAL) 5 MG tablet Take 10 mg by mouth.      methylPREDNISolone (MEDROL DOSEPACK)  4 mg tablet use as directed  Qty: 1 each, Refills: 1    Associated Diagnoses: Primary osteoarthritis of right hip      potassium chloride (MICRO-K) 10 MEQ CpSR Take 10 mEq by mouth once daily.      pregabalin (LYRICA) 75 MG capsule Take 75 mg by mouth 2 (two) times daily.                 Discharge Diagnosis: Primary osteoarthritis of right hip [M16.11]  Condition on Discharge: Stable with no complications to procedure   Diet on Discharge: Same as before.  Activity: as per instruction sheet.  Discharge to: Home with a responsible adult.  Follow up: 2-4 weeks       Please call my office or pager at [phone] if experienced any weakness or loss of sensation, fever > 101.5, pain uncontrolled with oral medications, persistent nausea/vomiting/or diarrhea, redness or drainage from the incisions, or any other worrisome concerns. If physician on call was not reached or could not communicate with our office for any reason please go to the nearest emergency department

## 2024-04-17 NOTE — OP NOTE
Hip Joint Injection under Fluoroscopic Guidance    The procedure, risks, benefits, and options were discussed with the patient. There are no contraindications to the procedure. The patent expressed understanding and agreed to the procedure. Informed written consent was obtained prior to the start of the procedure and can be found in the patient's chart.    PATIENT NAME: Chinyere Najera   MRN: [id number]     DATE OF PROCEDURE: 04/17/2024    PROCEDURE: Right Hip Joint Injection under Fluoroscopic Guidance    PRE-OP DIAGNOSIS: Primary osteoarthritis of right hip [M16.11]    POST-OP DIAGNOSIS: Primary osteoarthritis of right hip [M16.11]    PHYSICIAN: Laura Li DO    ASSISTANTS: None     MEDICATIONS INJECTED: Preservative-free Kenalog 40mg with 3cc of Bupivacine 0.25%     LOCAL ANESTHETIC INJECTED: Xylocaine 2%     SEDATION: None    ESTIMATED BLOOD LOSS: None    COMPLICATIONS: None    TECHNIQUE: Time-out was performed to identify the patient and procedure to be performed. With the patient laying in a supine position, the surgical area was prepped and draped in the usual sterile fashion using ChloraPrep and a fenestrated drape. The area overlying the hip joint was determined under fluoroscopy guidance. Skin anesthesia was achieved by injecting Lidocaine 2% over the injection site. A 22 gauge, 5 inch spinal quinke needle was introduced under fluoroscopy until the tip reached the greater trochanter. The tip of the needle was hinged cephalad from the greater trochanter into the joint space.  When the needle tip was in the appropriate position, and there was no blood aspiration, Contrast dye  Omnipaque (300mg/mL) was injected to confirm placement and there was no vascular runoff. 4 mL of the medication mixture listed above was injected slowly. The needles were removed and bleeding was nil. A sterile dressing was applied. No specimens collected. The patient tolerated the procedure well.      The patient was monitored  after the procedure in the recovery area. They were given post-procedure and discharge instructions to follow at home. The patient was discharged in a stable condition.      Laura Li DO

## 2024-04-19 ENCOUNTER — TELEPHONE (OUTPATIENT)
Dept: PAIN MEDICINE | Facility: CLINIC | Age: 74
End: 2024-04-19
Payer: MEDICARE

## 2024-05-03 ENCOUNTER — OFFICE VISIT (OUTPATIENT)
Dept: PAIN MEDICINE | Facility: CLINIC | Age: 74
End: 2024-05-03
Payer: MEDICARE

## 2024-05-03 VITALS
HEART RATE: 66 BPM | HEIGHT: 65 IN | DIASTOLIC BLOOD PRESSURE: 80 MMHG | WEIGHT: 223.88 LBS | BODY MASS INDEX: 37.3 KG/M2 | SYSTOLIC BLOOD PRESSURE: 135 MMHG

## 2024-05-03 DIAGNOSIS — M25.551 PAIN OF RIGHT HIP: ICD-10-CM

## 2024-05-03 DIAGNOSIS — M25.551 RIGHT HIP PAIN: ICD-10-CM

## 2024-05-03 DIAGNOSIS — G89.4 CHRONIC PAIN SYNDROME: ICD-10-CM

## 2024-05-03 DIAGNOSIS — M16.11 PRIMARY OSTEOARTHRITIS OF RIGHT HIP: Primary | ICD-10-CM

## 2024-05-03 PROCEDURE — 1101F PT FALLS ASSESS-DOCD LE1/YR: CPT | Mod: CPTII,S$GLB,, | Performed by: NURSE PRACTITIONER

## 2024-05-03 PROCEDURE — 3288F FALL RISK ASSESSMENT DOCD: CPT | Mod: CPTII,S$GLB,, | Performed by: NURSE PRACTITIONER

## 2024-05-03 PROCEDURE — 3008F BODY MASS INDEX DOCD: CPT | Mod: CPTII,S$GLB,, | Performed by: NURSE PRACTITIONER

## 2024-05-03 PROCEDURE — 1160F RVW MEDS BY RX/DR IN RCRD: CPT | Mod: CPTII,S$GLB,, | Performed by: NURSE PRACTITIONER

## 2024-05-03 PROCEDURE — 1125F AMNT PAIN NOTED PAIN PRSNT: CPT | Mod: CPTII,S$GLB,, | Performed by: NURSE PRACTITIONER

## 2024-05-03 PROCEDURE — 99214 OFFICE O/P EST MOD 30 MIN: CPT | Mod: S$GLB,,, | Performed by: NURSE PRACTITIONER

## 2024-05-03 PROCEDURE — 99999 PR PBB SHADOW E&M-EST. PATIENT-LVL IV: CPT | Mod: PBBFAC,,, | Performed by: NURSE PRACTITIONER

## 2024-05-03 PROCEDURE — 3079F DIAST BP 80-89 MM HG: CPT | Mod: CPTII,S$GLB,, | Performed by: NURSE PRACTITIONER

## 2024-05-03 PROCEDURE — 3075F SYST BP GE 130 - 139MM HG: CPT | Mod: CPTII,S$GLB,, | Performed by: NURSE PRACTITIONER

## 2024-05-03 PROCEDURE — 1159F MED LIST DOCD IN RCRD: CPT | Mod: CPTII,S$GLB,, | Performed by: NURSE PRACTITIONER

## 2024-05-03 NOTE — PROGRESS NOTES
[facility] Patient Evaluation    Referred by: No ref. provider found   Reason for referral: * No diagnoses found *     CC:   Chief Complaint   Patient presents with    Follow-up    Hip Pain         5/3/2024    10:39 AM 4/2/2024    11:05 AM   Last 3 PDI Scores   Pain Disability Index (PDI) 10 42     Interval history 05/03/2024:  72 year female presents today for a follow-up appointment she has a history of chronic right hip pain she is status post a right intra-articular hip injection with Dr. Li on 04/17/2024  that provided her 99% relief of her symptoms and improvement in her functionality.  She denies any new pain denies profound weakness she acknowledges that she will return to a home exercise plan that includes aqua therapy.  She will also restart her physical therapy.      Subjective 04/02/24  Chinyere Najera is a 73 y.o. female who presents complaining of right hip pain.  The pain radiates to the groin into the right knee.  Pain is worse with standing, walking and daily activities.  She was previously undergone intra-articular right hip injections with excellent relief ranging anywhere from 8 months to 18 months.  She would like to repeat a hip injection.     Location: right hip pain   Onset: years, worse over last 6 months  Current Pain Score: 9/10  Daily Pain of Range: 9-10/10  Quality: Aching, Throbbing, Deep, and Sharp  Radiation:  Right groin and right knee  Worsened by: lying down, sitting, standing for more than a few minutes, walking for more than a few minutes, and daily activity.  Improved by: nothing    Patient denies night fever/night sweats, urinary incontinence, bowel incontinence, significant weight loss, significant motor weakness, and loss of sensations.    Previous Interventions:  -04/17/2024-right intra-articular hip injection 99% relief  - several right intra-articular hip injections with Orthopedics with relief ranging from 8-18  months    Previous Therapies:  PT/OT: yes   Chiropractor:   HEP:  Yes - physical therapy twice weekly and aquatic exercise up to 4 times weekly  Relevant Surgery: no   Previous Medications:   - NSAIDS:  Diclofenac  - Muscle Relaxants:    - TCAs:   - SNRIs:   - Topicals:   - Anticonvulsants:  Lyrica   - Opioids:   - Adjuvants:  Medrol Dosepak    Current Pain Medications:       Review of Systems:  Review of Systems   Musculoskeletal:  Positive for joint pain.     GENERAL:  No weight loss, malaise or fevers.  HEENT:   No recent changes in vision or hearing  NECK:  No difficulty with swallowing. No stridor.   RESPIRATORY:  Negative for cough, wheezing or shortness of breath, patient denies any recent URI.  CARDIOVASCULAR:  Negative for chest pain, leg swelling or palpitations.  GI:  Negative for abdominal discomfort, blood in stools or black stools or change in bowel habits.  MUSCULOSKELETAL:  See HPI.  SKIN:  Negative for lesions, rash, and itching.  PSYCH:  No mood disorder or recent psychosocial stressors.    HEMATOLOGY/LYMPHOLOGY:  Negative for prolonged bleeding, bruising easily or swollen nodes.  Patient is not currently taking any anti-coagulants  NEURO:   No history of headaches, syncope, paralysis, seizures or tremors.  All other reviewed and negative other than HPI.    History:  Current medications, allergies, medical history, surgical history,   family history, and social history were reviewed in the chart as marked.    Full Medication List:    Current Outpatient Medications:     albuterol (PROVENTIL/VENTOLIN HFA) 90 mcg/actuation inhaler, Inhale 2 puffs into the lungs., Disp: , Rfl:     alendronate (FOSAMAX) 70 MG tablet, Take 70 mg by mouth every 7 days., Disp: , Rfl:     amLODIPine (NORVASC) 5 MG tablet, Take 1 tablet by mouth once daily., Disp: , Rfl:     ascorbic acid, vitamin C, (VITAMIN C) 1000 MG tablet, Take 1,000 mg by mouth., Disp: , Rfl:     aspirin (ECOTRIN) 81 MG EC tablet, Take 81 mg by mouth  once daily., Disp: , Rfl:     azilsartan med-chlorthalidone (EDARBYCLOR) 40-12.5 mg Tab, Take by mouth once daily., Disp: , Rfl:     calcium-vitamin D3 (CALCIUM 500 + D) 500 mg(1,250mg) -200 unit per tablet, Take by mouth once daily., Disp: , Rfl:     diclofenac (VOLTAREN) 75 MG EC tablet, Take 75 mg by mouth 2 (two) times daily., Disp: , Rfl:     ergocalciferol (ERGOCALCIFEROL) 50,000 unit Cap, Take 50,000 Units by mouth., Disp: , Rfl:     esomeprazole (NEXIUM) 40 MG capsule, Take 40 mg by mouth before breakfast., Disp: , Rfl:     famotidine (PEPCID) 20 MG tablet, Take 20 mg by mouth., Disp: , Rfl:     fexofenadine (ALLEGRA) 180 MG tablet, Take 180 mg by mouth once daily., Disp: , Rfl:     ibuprofen (ADVIL,MOTRIN) 800 MG tablet, Take 800 mg by mouth every 6 (six) hours as needed for Pain., Disp: , Rfl:     levocetirizine (XYZAL) 5 MG tablet, Take 10 mg by mouth., Disp: , Rfl:     methylPREDNISolone (MEDROL DOSEPACK) 4 mg tablet, use as directed, Disp: 1 each, Rfl: 1    mycophenolate (CELLCEPT) 500 mg Tab, Take by mouth., Disp: , Rfl:     olmesartan-hydrochlorothiazide (BENICAR HCT) 40-25 mg per tablet, Take 1 tablet by mouth., Disp: , Rfl:     potassium chloride (MICRO-K) 10 MEQ CpSR, Take 10 mEq by mouth once daily., Disp: , Rfl:     pregabalin (LYRICA) 75 MG capsule, Take 75 mg by mouth 2 (two) times daily., Disp: , Rfl:     rosuvastatin (CRESTOR) 10 MG tablet, Take 10 mg by mouth., Disp: , Rfl:     azelastine (ASTELIN) 137 mcg (0.1 %) nasal spray, 1 spray by Nasal route., Disp: , Rfl:      Allergies:  Patient has no known allergies.     Medical History:   has a past medical history of Arthritis, GERD (gastroesophageal reflux disease), HTN (hypertension), Lupus, and Vertigo.    Surgical History:   has a past surgical history that includes Partial hysterectomy; Cholecystectomy; and Injection of joint (Right, 4/17/2024).    Family History:  family history is not on file.    Social History:   reports that she has  "never smoked. She does not have any smokeless tobacco history on file. She reports current alcohol use. She reports that she does not use drugs.    Physical Exam:  Vitals:    05/03/24 1038   BP: 135/80   Pulse: 66   Weight: 101.5 kg (223 lb 14 oz)   Height: 5' 5" (1.651 m)   PainSc:   1   PainLoc: Hip       GENERAL: Well appearing, in no acute distress, alert and oriented x3.  PSYCH:  Mood and affect appropriate.  SKIN: Skin color, texture, turgor normal, no rashes or lesions.  HEAD/FACE:  Normocephalic, atraumatic. Cranial nerves grossly intact.  NECK: Normal ROM. Supple.   CV: RRR with palpation of the radial artery.  PULM: No evidence of respiratory difficulty, symmetric chest rise.  GI:  Soft and non-distended.  MSK: Straight leg raising is Positive on the Right to radicular pain. + pain to palpation over the facet joints of the lumbar spine. + pain with lumbar facet loading.  LAMAR test is positive for right hip pain. Pain with ROM of the Right Hip. Mild pain over the GBT bilaterally. No obvious deformities, edema, or skin discoloration.  No atrophy or tone abnormalities are noted.   NEURO: Bilateral upper and lower extremity coordination and strength is symmetric.  No loss of sensation is noted.  MENTAL STATUS: A x O x 3, good concentration, speech is fluent and goal directed  MOTOR: 5/5 in bilateral lower extremity muscle groups  GAIT:  Antalgic.  Ambulates unassisted.    Imaging:  EXAMINATION: MRI lumbar spine without contrast     EXAMINATION DATE: 12/20/2023     COMPARISON: None     TECHNIQUE: Multiplanar multisequence images of the lumbar spine were obtained without the use contrast.     INDICATION: Pain in both arms     FINDINGS:     5 lumbar bodies. The lordotic curvature is normal. Grade 1 anterolisthesis of L4 and L5 without evidence for spondylolysis. Vertebral heights are maintained. Marrow signal pattern is normal. No evidence for compression informed, fracture, dislocation. Intervertebral disc " space narrowing is identified at L4-L5 and L5-S1 with diffuse disc desiccation. Conus medullaris at the level of L1. The proximal spinal cord is normal caliber. The distal nerve roots are normal.     T12-L1: Small broad-based disc bulge with ligament flavum hypertrophy and degenerative changes facets. No evidence for central canal stenosis or neural foraminal narrowing.     L1-L2: Broad-based disc bulge with ligament flavum hypertrophy and degenerative changes facets. No evidence for central canal stenosis or neural foraminal narrowing.     L2-L3: Broad-based disc bulge with ligament flavum approach degenerative changes facets. Encroachment upon the left lateral recess is identified with impingement transcending L3 nerve root. No significant neural foraminal narrowing or central canal stenosis.     L3-L4: Broad-based disc bulge with ligament flavum hypertrophy and degenerative changes facets. Involvement of bilateral lateral recesses greater on the left right with impingement transcending L4 nerve roots. No evidence for central canal stenosis. Minimal neural foraminal narrowing on the right.     L4-L5: Exaggerated broad-based disc bulge with ligament flavum hypertrophy and degenerative changes facets causing minimal moderate central canal stenosis. Involvement of the bilateral lateral recesses with impingement of the transcending L5 nerve roots. Moderate bilateral neural foraminal narrowing.     L5-S1: Central disc protrusion with effacement of ventral CSF. No evidence for central canal stenosis. Involvement of bilateral lateral recesses with impingement of the transcending S1 nerve roots. Minimal neural foraminal narrowing.     Degenerative changes of the SI joints. The visualized hollow and solid viscera grossly normal         XR HIPS BILATERAL 2 VIEW INCL AP PELVIS     CLINICAL HISTORY:  Pain in right hip     FINDINGS:  Bilateral two views hips to include pelvis.     Right: No fracture dislocation bone  destruction seen.     Left: No fracture dislocation bone destruction seen.        Electronically signed by: Oziel Aranda MD  Date:                                            12/15/2023    Labs:  BMP  Lab Results   Component Value Date     09/01/2016    K 3.8 09/01/2016     09/01/2016    CO2 26 09/01/2016    BUN 13 09/01/2016    CREATININE 0.83 09/01/2016    CALCIUM 9.6 09/01/2016     Lab Results   Component Value Date    ALT 13 09/01/2016    AST 19 09/01/2016    ALKPHOS 126 09/01/2016    BILITOT 0.4 09/01/2016     Lab Results   Component Value Date    WBC 6.8 09/01/2016    HGB 12.0 09/01/2016    HCT 38.5 09/01/2016    MCV 82.2 09/01/2016     09/01/2016         Assessment:  Problem List Items Addressed This Visit    None  Visit Diagnoses       Primary osteoarthritis of right hip    -  Primary    Right hip pain        Chronic pain syndrome        Pain of right hip                  04/02/2024 - Chinyere Najera is a 73 y.o. female who  has a past medical history of Arthritis, GERD (gastroesophageal reflux disease), HTN (hypertension), Lupus, and Vertigo.  By history and examination this patient has chronic right hip pain as well as low back pain with radiculopathy.  The underlying cause is hip osteoarthritis, degenerative disc disease and foraminal stenosis.  By history and exam of the bulk of her pain appears to be from the hip joint itself however I do feel like there is some contribution from her low back.  Lumbar MRI and hip x-rays were reviewed.  We discussed the underlying diagnoses and multiple treatment options including non-opioid medications, interventional procedures, physical therapy, home exercise, core muscle enhancement, and weight loss.  I will schedule the patient for a right intra-articular hip injection.  In the future she may also benefit from a lumbar epidural steroid injection as I feel this is also contributing to her pain.  The risks and benefits of each treatment option were  discussed and all questions were answered.      7/03[age][id number]-lznr-old female with a history of chronic right hip pain she also reported previously low back and radiculopathy symptoms.  Recently provided a right intra-articular hip injection on 04/17/2024 that provided her 99% relief her hip pain based on review of her recent images is related to hip osteoarthritis.  Her low back pain could be stemming from degenerative disc disease and foraminal stenosis.  For now we will hold off on any more injections considering her substantial relief with the right hip injection recommended she return to physical therapy as well as establish a home exercise plan.  Patient verbalized understanding and agreed she did inquire about repeating a right intra-articular hip injection prior to a vacation in November recommend that she could call the office to reschedule.    Treatment Plan:   Procedures:  none at that time. Can right intra-articular hip injection in November (patient can call clinic)  Consider epidural steroid injection in the future for radicular symptoms  PT/OT/HEP: I have stressed the importance of physical activity and a home exercise plan to help with pain and improve health.  Continue with home exercise regimen and physical therapy  Medications:    - no new medications were prescribed at this visit.   -  Reviewed and consistent with medication use as prescribed.  Imaging:  MRI lumbar spine and hip x-rays were reviewed.  Follow Up:  3-4 months or sooner if needed.    STEVE ZamarripaC  [facility]    Disclaimer: This note was partly generated using dictation software which may occasionally result in transcription errors.

## 2024-09-13 ENCOUNTER — TELEPHONE (OUTPATIENT)
Dept: PAIN MEDICINE | Facility: CLINIC | Age: 74
End: 2024-09-13
Payer: MEDICARE

## 2024-09-13 DIAGNOSIS — M25.551 RIGHT HIP PAIN: Primary | ICD-10-CM

## 2024-09-13 DIAGNOSIS — M16.11 PRIMARY OSTEOARTHRITIS OF RIGHT HIP: ICD-10-CM

## 2024-09-13 NOTE — TELEPHONE ENCOUNTER
----- Message from Rojelio Li DO sent at 2024  2:23 PM CDT -----  Regarding: Order for VIJAY CONNER    Patient Name: VIJAY CONNER([id number])  Sex: Female  : 1950      PCP: TRACIE, PRIMARY DOCTOR    Center: [facility]     Types of orders made on 2024: Procedure Request    Order Date:2024  Ordering User:ROJELIO LI [[id number]]  Encounter Provider:Rojelio Li DO [[id number]]  Author  domingo Provider: Rojelio Li DO [[id number]]  Department:[facility][[id number]]    Common Order Information  Procedure -> Joint/Bursa Injection (Specify joint and laterality) Cmt: Right             Intra-articular hip injection    Pre-op Diagnosis -> Chronic right hip pain     Order Specific Information  Order: Procedure Order to Pain Management [Custom: [id number]]  Order #:          [id number]: 1 FUTURE      Priority: Routine  Class: Clinic Performed    Future Order Information      Expires on:2025            Expected by:2024                   Associated Diagnoses      M25.551 Right hip pain      M16.11 Primary osteoarthritis of right hip      Physician -> Gelter         Facility Name: -> [facility]           Priority: Routine  Class: Clinic Performed    Future Order Information      Exp  ires on:2025            Expected by:2024                   Associated Diagnoses      M25.551 Right hip pain      M16.11 Primary osteoarthritis of right hip      Procedure -> Joint/Bursa Injection (Specify joint and laterality) Cmt:                 Right Intra-articular hip injection        Physician -> Gelter         Pre-op Diagnosis -> Chronic right hip pain         Facility Name: -> [facility]

## 2024-09-26 ENCOUNTER — TELEPHONE (OUTPATIENT)
Dept: PAIN MEDICINE | Facility: CLINIC | Age: 74
End: 2024-09-26
Payer: MEDICARE

## 2024-09-26 NOTE — TELEPHONE ENCOUNTER
----- Message from Juan Antonio Sparks MA sent at 9/26/2024  9:10 AM CDT -----  Good morning, please advise....  ----- Message -----  From: Avril Lamar  Sent: 9/26/2024   8:25 AM CDT  To: [facility] Staff    Type:  Needs Medical Advice    Who Called:  Chinyere Najera    Would the patient rather a call back or a response via [facility]?  call  Best Call Back Number:   [phone]  Additional Information:  Pt has a procedure scheduled on 11/6/24 and wants to reschedule if possible to a sooner appt.  Pt would like a call back.

## 2024-10-21 ENCOUNTER — TELEPHONE (OUTPATIENT)
Dept: PAIN MEDICINE | Facility: CLINIC | Age: 74
End: 2024-10-21
Payer: MEDICARE

## 2024-10-21 NOTE — TELEPHONE ENCOUNTER
----- Message from Cr Baldwin sent at 10/21/2024 11:56 AM CDT -----  Please assist  ----- Message -----  From: Conchita Kumar  Sent: 10/21/2024  10:52 AM CDT  To: [facility] Staff    Pt Requesting Call Back    Who called: pt  Who called for pt:  Best call back #:  [phone]  Add notes: pt said she wants to know if Dr. Li has the information of if her surgery is approved by [employer] (pt's insurance company)

## 2024-10-23 ENCOUNTER — TELEPHONE (OUTPATIENT)
Dept: PAIN MEDICINE | Facility: CLINIC | Age: 74
End: 2024-10-23
Payer: MEDICARE

## 2024-10-28 NOTE — PRE-PROCEDURE INSTRUCTIONS
Patient reviewed on 10/28/2024.  Okay to proceed at [facility]. The following pre-procedure instructions and arrival time have been reviewed with patient via phone and sent to patient portal for review.  Patient verbalized an understanding.  Pt to be accompanied by Miley day of procedure as responsible .      Dear Chinyere,     Please read over the following pre-procedure instructions in it's entirety as there is helpful information here to get you well prepared for your upcoming procedure.     You are scheduled for a procedure with Dr. Li on 10/30/2024.     [facility] at the corner of [facility] and [facility]. It is in the [place] [facility] next to [employer]. The address is: [address] [place] [address]. Take the elevator to the 2nd floor.       Registration check in time: 9:05 am  Scheduled procedure time: 10:05 am     If you are receiving sedation, you CANNOT drive yourself and must have a responsible friend or family member (no rideshare) to drive you home.        You should take any medications that you routinely take for blood pressure, heart medications, thyroid, cholesterol, etc.      The fasting restrictions are dependent on whether or not you are receiving sedation. Sedation is not available for all procedures.      Your fasting instructions/Sedation type are as follow:  Oral Sedation. You do not need to fast before this procedure.  You can eat and drink like normal.  You CANNOT drive yourself and must have a .        If you are on blood thinners, you need to follow the anticoagulation instructions that had been discussed previously. You should only stop the blood thinners if it was approved by your primary care physician or your cardiologist. In the event that you are not able to stop your blood thinners, a blood thinner was not listed on your medication list, or we were not able to get clearance from your cardiologist, then the procedure may have to be  postponed/canceled.      IF you were told to stop your blood thinners, this is how long you should generally hold some of the more common ones. Remember that stopping blood thinners is only necessary for certain procedures. If you are unsure of your instructions, please call us.   Aspirin - 5 days  Plavix/Clopidogrel - 7 days  Warfarin / Coumadin - 5 days  Eliquis - 3 days  Pradaxa/Dabigatran - 4 days  Xarelto/Rivaroxaban - 3 days     If you are a diabetic, do not take your medication if you will be fasting, but bring it with you. Please plan on being here for roughly 2-3 hours.     Please call us if you have been sick (running fever, having any flu-like symptoms) or have been taking ANTIBIOTICS in the past 2 weeks or had any outpatient procedures other than with us (colonoscopy, endoscopy, OBGYN, dental, etc.).      If you have been previously COVID positive, you will need to hold off on your procedure until you are symptom free for 10 days. If you did not have any symptoms, you can have your procedure 10 days from your positive test result.         On the morning of your procedure:  *HOLD ALL VITAMINS, MINERALS, HERBS (INCLUDING HERBAL TEAS) AND SUPPLEMENTS  *SHOWER WITH ANTIBACTERIAL SOAP (EX. DIAL) NIGHT BEFORE AND MORNING OF PROCEDURE  *DO NOT APPLY ANY LOTIONS, OILS, POWDERS, PERFUME/COLOGNE, OINTMENTS, GELS, CREAMS, MAKEUP OR DEODORANT TO YOUR SKIN MORNING OF PROCEDURE  *LEAVE JEWELRY AND ANY VALUABLES AT HOME  *WEAR LOOSE COMFORTABLE CLOTHING         Please reply to this portal message as receipt of delivery.     Thank you,  [facility]  [place], LPN [facility]  Pre-Admit

## 2024-10-30 ENCOUNTER — HOSPITAL ENCOUNTER (OUTPATIENT)
Facility: HOSPITAL | Age: 74
Discharge: HOME | End: 2024-10-30
Attending: STUDENT IN AN ORGANIZED HEALTH CARE EDUCATION/TRAINING PROGRAM | Admitting: STUDENT IN AN ORGANIZED HEALTH CARE EDUCATION/TRAINING PROGRAM
Payer: MEDICARE

## 2024-10-30 VITALS
TEMPERATURE: 98 F | BODY MASS INDEX: 37.56 KG/M2 | HEIGHT: 64 IN | SYSTOLIC BLOOD PRESSURE: 134 MMHG | WEIGHT: 220 LBS | RESPIRATION RATE: 18 BRPM | OXYGEN SATURATION: 98 % | DIASTOLIC BLOOD PRESSURE: 65 MMHG | HEART RATE: 71 BPM

## 2024-10-30 DIAGNOSIS — M16.11 OSTEOARTHRITIS OF RIGHT HIP, UNSPECIFIED OSTEOARTHRITIS TYPE: ICD-10-CM

## 2024-10-30 DIAGNOSIS — M25.551 PAIN OF RIGHT HIP: Primary | ICD-10-CM

## 2024-10-30 DIAGNOSIS — G89.29 CHRONIC PAIN: ICD-10-CM

## 2024-10-30 PROCEDURE — 20610 DRAIN/INJ JOINT/BURSA W/O US: CPT | Mod: RT,,, | Performed by: STUDENT IN AN ORGANIZED HEALTH CARE EDUCATION/TRAINING PROGRAM

## 2024-10-30 PROCEDURE — 25000003 PHARM REV CODE 250: Performed by: STUDENT IN AN ORGANIZED HEALTH CARE EDUCATION/TRAINING PROGRAM

## 2024-10-30 PROCEDURE — 63600175 PHARM REV CODE 636 W HCPCS: Performed by: STUDENT IN AN ORGANIZED HEALTH CARE EDUCATION/TRAINING PROGRAM

## 2024-10-30 PROCEDURE — 20610 DRAIN/INJ JOINT/BURSA W/O US: CPT | Mod: RT | Performed by: STUDENT IN AN ORGANIZED HEALTH CARE EDUCATION/TRAINING PROGRAM

## 2024-10-30 PROCEDURE — 25500020 PHARM REV CODE 255: Performed by: STUDENT IN AN ORGANIZED HEALTH CARE EDUCATION/TRAINING PROGRAM

## 2024-10-30 PROCEDURE — 77002 NEEDLE LOCALIZATION BY XRAY: CPT | Mod: 26,,, | Performed by: STUDENT IN AN ORGANIZED HEALTH CARE EDUCATION/TRAINING PROGRAM

## 2024-10-30 RX ORDER — LIDOCAINE HYDROCHLORIDE 20 MG/ML
INJECTION, SOLUTION EPIDURAL; INFILTRATION; INTRACAUDAL; PERINEURAL
Status: DISCONTINUED | OUTPATIENT
Start: 2024-10-30 | End: 2024-10-30 | Stop reason: HOSPADM

## 2024-10-30 RX ORDER — TRIAMCINOLONE ACETONIDE 40 MG/ML
INJECTION, SUSPENSION INTRA-ARTICULAR; INTRAMUSCULAR
Status: DISCONTINUED | OUTPATIENT
Start: 2024-10-30 | End: 2024-10-30 | Stop reason: HOSPADM

## 2024-10-30 RX ORDER — ALPRAZOLAM 0.5 MG/1
0.5 TABLET, ORALLY DISINTEGRATING ORAL
Status: DISCONTINUED | OUTPATIENT
Start: 2024-10-30 | End: 2024-10-30 | Stop reason: HOSPADM

## 2024-10-30 RX ORDER — BUPIVACAINE HYDROCHLORIDE 2.5 MG/ML
INJECTION, SOLUTION EPIDURAL; INFILTRATION; INTRACAUDAL
Status: DISCONTINUED | OUTPATIENT
Start: 2024-10-30 | End: 2024-10-30 | Stop reason: HOSPADM

## 2024-10-30 RX ADMIN — ALPRAZOLAM 0.5 MG: 0.5 TABLET, ORALLY DISINTEGRATING ORAL at 09:10

## 2024-10-30 NOTE — DISCHARGE SUMMARY
Discharge Note  Short Stay      SUMMARY     Admit Date: 10/30/2024    Attending Physician: Laura Li      Discharge Physician: Laura Li      Discharge Date: 10/30/2024 9:35 AM    Procedure(s) (LRB):  Right Intra-articular hip injection (Right)    Final Diagnosis: Right hip pain [M25.551]    Disposition: Home or self care    Patient Instructions:   Current Discharge Medication List        CONTINUE these medications which have NOT CHANGED    Details   aspirin (ECOTRIN) 81 MG EC tablet Take 81 mg by mouth once daily.      azilsartan med-chlorthalidone (EDARBYCLOR) 40-12.5 mg Tab Take by mouth once daily.      esomeprazole (NEXIUM) 40 MG capsule Take 40 mg by mouth before breakfast.      famotidine (PEPCID) 20 MG tablet Take 20 mg by mouth.      fexofenadine (ALLEGRA) 180 MG tablet Take 180 mg by mouth once daily.      levocetirizine (XYZAL) 5 MG tablet Take 10 mg by mouth.      mycophenolate (CELLCEPT) 500 mg Tab Take by mouth.      olmesartan-hydrochlorothiazide (BENICAR HCT) 40-25 mg per tablet Take 1 tablet by mouth.      rosuvastatin (CRESTOR) 10 MG tablet Take 10 mg by mouth.      albuterol (PROVENTIL/VENTOLIN HFA) 90 mcg/actuation inhaler Inhale 2 puffs into the lungs.      alendronate (FOSAMAX) 70 MG tablet Take 70 mg by mouth every 7 days.      amLODIPine (NORVASC) 5 MG tablet Take 1 tablet by mouth once daily.      ascorbic acid, vitamin C, (VITAMIN C) 1000 MG tablet Take 1,000 mg by mouth.      azelastine (ASTELIN) 137 mcg (0.1 %) nasal spray 1 spray by Nasal route.      calcium-vitamin D3 (CALCIUM 500 + D) 500 mg(1,250mg) -200 unit per tablet Take by mouth once daily.      diclofenac (VOLTAREN) 75 MG EC tablet Take 75 mg by mouth 2 (two) times daily.      ergocalciferol (ERGOCALCIFEROL) 50,000 unit Cap Take 50,000 Units by mouth.      ibuprofen (ADVIL,MOTRIN) 800 MG tablet Take 800 mg by mouth every 6 (six) hours as needed for Pain.      methylPREDNISolone (MEDROL DOSEPACK) 4 mg tablet use  as directed  Qty: 1 each, Refills: 1    Associated Diagnoses: Primary osteoarthritis of right hip      potassium chloride (MICRO-K) 10 MEQ CpSR Take 10 mEq by mouth once daily.      pregabalin (LYRICA) 75 MG capsule Take 75 mg by mouth 2 (two) times daily.                 Discharge Diagnosis: Right hip pain [M25.551]  Condition on Discharge: Stable with no complications to procedure   Diet on Discharge: Same as before.  Activity: as per instruction sheet.  Discharge to: Home with a responsible adult.  Follow up: 2-4 weeks       Please call my office or pager at [phone] if experienced any weakness or loss of sensation, fever > 101.5, pain uncontrolled with oral medications, persistent nausea/vomiting/or diarrhea, redness or drainage from the incisions, or any other worrisome concerns. If physician on call was not reached or could not communicate with our office for any reason please go to the nearest emergency department

## 2024-10-30 NOTE — DISCHARGE INSTRUCTIONS
[facility] [place]  Dr. Laura Li  Messaging service # [phone]    POST-PROCEDURE INSTRUCTIONS:    Today you had an injection that included a steroid medications.  The steroid may or may not have been mixed with a local anesthetic when it was injected.   If the injection was in the neck, you may feel some pressure, numbness, or slight weakness in the arm after the procedure for a short period of time (this is a normal response), if this persists for longer than 1 day please contact our office or go to the emergency room.  If the injection was in the low back, you may feel some pressure, numbness, or slight weakness in the leg after the procedure for a short period of time (this is a normal response), if this persists for longer than 1 day please contact our office or go to the emergency room.  You may get side effects from the steroid.  This is not uncommon.  Symptoms include: elevated blood sugar, elevated blood pressure, headache, flushing, nausea, insomnia.  These symptoms are transient and will resolve within 1-3 days.  If symptoms last longer than this please contact our office or head to the emergency room.  Steroid medications can take anywhere from 3-14 days to take effect (rarely longer).  You may notice that your pain worsens for a short period of time after the injection, this would not be unusual due to the pressure and trauma from the needle.    If you do not have a follow up appointment scheduled, please contact my office (or the office of the physician who referred you for the procedure) to get a post-procedure follow up scheduled 2-4 weeks after the procedure.  This can be done as a virtual visit if that is more convenient for you.      What you need to do:    Keep a record of your response to the injection you had today.    How much relief did you get?   When did the relief start and how long did it last?  Were you able to decrease the use of any of your pain  medications?  Were you able to increase your level of activity?  How long did the relief last?    What to watch out for:    If you experience any of the following symptoms after your procedure, please notify the messaging service immediately (see above for contact information):   fever (increased oral temperature)   bleeding or swelling at the injection site,    drainage, rash or redness at the injection site    possible signs of infection    increased pain at the injection site   worsening of your usual pain   severe headache   new or worsening numbness    new arm and/or leg weakness, or    changes in bowel and/or bladder function: urinating or defecating on yourself and not knowing that you did it.    PLEASE FOLLOW ALL INSTRUCTIONS CAREFULLY     Do not engage in strenuous activity (e.g., lifting or pushing heavy objects or repeated bending) for 24 hours.     Do not take a bath, swim or use Jacuzzi for 24 hours after procedure. (A shower is fine).   Remove any Band-Aids when you get home.    Use cold/ice, as needed for comfort.  We recommend the use of cold therapy alternating on for 20 minutes, off for 20 minutes.    Do not apply direct heat (heating pad or heat packs) to the injection site for 24 hours.     Resume your usual medications, unless instructed otherwise by your Pain Physician.     If you are on warfarin (Coumadin) or other blood thinner, resume this medication as instructed by your prescribing Physician.    IF AT ANY POINT YOU ARE VERY CONCERNED ABOUT YOUR SYMPTOMS, PLEASE GO TO THE EMERGENCY ROOM.    If you develop worsening pain, weakness, numbness, lose bowel or bladder control (i.e., having an accident where you did not even know you had to go to the bathroom and suddenly noticed you soiled yourself), saddle anesthesia (a loss of sensation restricted to the area of the buttocks, anus and between the legs -- i.e., those parts of your body that would touch a saddle if you were sitting on one) you  need to go immediately to the emergency department for evaluation and treatment.    ----------------------------------------------------------------------------------------------------------------------------------------------------------------  If you received Sedation please read the following instructions:  POST SEDATION INSTRUCTIONS    Today you received intravenous medication (also known as sedation) that was used to help you relax and/or decrease discomfort during your procedure. This medication will be acting in your body for the next 24 hours, so you might feel a little tired or sleepy. This feeling will slowly wear off.   Common side effects associated with these medications include: drowsiness, dizziness, sleepiness, confusion, feeling excited, difficulty remembering things, lack of steadiness with walking or balance, loss of fine muscle control, slowed reflexes, difficulty focusing, and blurred vision.  Some over-the-counter and prescription medications (e.g., muscle relaxants, opioids, mood-altering medications, sedatives/hypnotics, antihistamines) can interact with the intravenous medication you received and cause an increased risk of the side effects listed above in addition to other potentially life threatening side effects. Use extreme caution if you are taking such medications, and consult with your Pain Physician or prescribing physician if you have any questions.  For the next 12-24 hours:    DO NOT--Drive a car, operate machinery or power tools   DO NOT--Drink any alcoholic beverages (not even beer), they may dangerously increase the risk of side effects.    DO NOT--Make any important legal or business decisions or sign important documents.  We advise you to have someone to assist you at home. Move slowly and carefully. Do not make sudden changes in position. Be aware of dizziness or light-headedness and move accordingly.   If you seek medical treatment within 24 hours, let the nurse or doctor  caring for you know that you have received the above medications. If you have any questions or concerns related to your sedation or treatment today please contact us.

## 2024-10-30 NOTE — OP NOTE
Hip Joint Injection under Fluoroscopic Guidance    The procedure, risks, benefits, and options were discussed with the patient. There are no contraindications to the procedure. The patent expressed understanding and agreed to the procedure. Informed written consent was obtained prior to the start of the procedure and can be found in the patient's chart.    PATIENT NAME: Chinyere Najera   MRN: [id number]     DATE OF PROCEDURE: 10/30/2024    PROCEDURE: Right Hip Joint Injection under Fluoroscopic Guidance    PRE-OP DIAGNOSIS: Right hip pain [M25.551]    POST-OP DIAGNOSIS: Right hip pain [M25.551]    PHYSICIAN: Laura Li DO    ASSISTANTS: None     MEDICATIONS INJECTED: Preservative-free Kenalog 40mg with 3cc of Bupivacine 0.25%     LOCAL ANESTHETIC INJECTED: Xylocaine 2%     SEDATION: None    ESTIMATED BLOOD LOSS: None    COMPLICATIONS: None    TECHNIQUE: Time-out was performed to identify the patient and procedure to be performed. With the patient laying in a supine position, the surgical area was prepped and draped in the usual sterile fashion using ChloraPrep and a fenestrated drape. The area overlying the hip joint was determined under fluoroscopy guidance. Skin anesthesia was achieved by injecting Lidocaine 2% over the injection site. A 22 gauge, 3.5 inch spinal quinke needle was introduced under fluoroscopy until the tip reached the greater trochanter. The tip of the needle was hinged cephalad from the greater trochanter into the joint space.  When the needle tip was in the appropriate position, and there was no blood aspiration, Contrast dye  Omnipaque (300mg/mL) was injected to confirm placement and there was no vascular runoff. 4 mL of the medication mixture listed above was injected slowly. The needles were removed and bleeding was nil. A sterile dressing was applied. No specimens collected. The patient tolerated the procedure well.    The patient was monitored after the procedure in the recovery area.  They were given post-procedure and discharge instructions to follow at home. The patient was discharged in a stable condition.      DO olvin Villalba

## 2024-10-30 NOTE — PLAN OF CARE
Pt in preop bay 25, VSS, meds given Pt denies any open wounds on body or the use of any immunizations or antibiotics in the past 2 weeks. ready to roll.

## 2024-10-30 NOTE — H&P
HPI  Patient presenting for Procedure(s) (LRB):  Right Intra-articular hip injection (Right)     Patient on Anti-coagulation No    No health changes since previous encounter    Past Medical History:   Diagnosis Date    Arthritis     GERD (gastroesophageal reflux disease)     HTN (hypertension)     Lupus     Vertigo      Past Surgical History:   Procedure Laterality Date    CHOLECYSTECTOMY      INJECTION OF JOINT Right 4/17/2024    Procedure: Right Intra-articular Hip Injection;  Surgeon: Laura Li DO;  Location: [facility];  Service: Pain Management;  Laterality: Right;  oral    PARTIAL HYSTERECTOMY       Review of patient's allergies indicates:  No Known Allergies   Current Facility-Administered Medications   Medication    alprazolam ODT dissolvable tablet 0.5 mg       PMHx, PSHx, Allergies, Medications reviewed in epic    ROS negative except pain complaints in HPI    OBJECTIVE:    There were no vitals taken for this visit.    PHYSICAL EXAMINATION:    GENERAL: Well appearing, in no acute distress, alert and oriented x3.  PSYCH:  Mood and affect appropriate.  SKIN: Skin color, texture, turgor normal, no rashes or lesions which will impact the procedure.  CV: RRR with palpation of the radial artery.  PULM: No evidence of respiratory difficulty, symmetric chest rise. Clear to auscultation.  NEURO: Cranial nerves grossly intact.    Plan:    Proceed with procedure as planned Procedure(s) (LRB):  Right Intra-articular hip injection (Right)    Laura Li  10/30/2024

## 2024-10-30 NOTE — PLAN OF CARE
Discharge instructions provided to the patient. Patient verbalized understanding of the instructions. VS stable, no concerns voiced. Escorted patient via wheelchair to family member awaiting in private vehicle.

## 2024-11-13 ENCOUNTER — OFFICE VISIT (OUTPATIENT)
Dept: PAIN MEDICINE | Facility: CLINIC | Age: 74
End: 2024-11-13
Payer: MEDICARE

## 2024-11-13 VITALS
BODY MASS INDEX: 38.76 KG/M2 | SYSTOLIC BLOOD PRESSURE: 150 MMHG | HEART RATE: 85 BPM | HEIGHT: 64 IN | WEIGHT: 227.06 LBS | DIASTOLIC BLOOD PRESSURE: 82 MMHG

## 2024-11-13 DIAGNOSIS — M47.816 LUMBAR SPONDYLOSIS: ICD-10-CM

## 2024-11-13 DIAGNOSIS — M54.9 DORSALGIA, UNSPECIFIED: ICD-10-CM

## 2024-11-13 DIAGNOSIS — G89.4 CHRONIC PAIN SYNDROME: ICD-10-CM

## 2024-11-13 DIAGNOSIS — M54.16 LUMBAR RADICULOPATHY, ACUTE: Primary | ICD-10-CM

## 2024-11-13 DIAGNOSIS — M51.362 DEGENERATION OF INTERVERTEBRAL DISC OF LUMBAR REGION WITH DISCOGENIC BACK PAIN AND LOWER EXTREMITY PAIN: ICD-10-CM

## 2024-11-13 PROCEDURE — 3079F DIAST BP 80-89 MM HG: CPT | Mod: CPTII,S$GLB,, | Performed by: NURSE PRACTITIONER

## 2024-11-13 PROCEDURE — 1125F AMNT PAIN NOTED PAIN PRSNT: CPT | Mod: CPTII,S$GLB,, | Performed by: NURSE PRACTITIONER

## 2024-11-13 PROCEDURE — 99999 PR PBB SHADOW E&M-EST. PATIENT-LVL V: CPT | Mod: PBBFAC,,, | Performed by: NURSE PRACTITIONER

## 2024-11-13 PROCEDURE — 3077F SYST BP >= 140 MM HG: CPT | Mod: CPTII,S$GLB,, | Performed by: NURSE PRACTITIONER

## 2024-11-13 PROCEDURE — 1160F RVW MEDS BY RX/DR IN RCRD: CPT | Mod: CPTII,S$GLB,, | Performed by: NURSE PRACTITIONER

## 2024-11-13 PROCEDURE — 1159F MED LIST DOCD IN RCRD: CPT | Mod: CPTII,S$GLB,, | Performed by: NURSE PRACTITIONER

## 2024-11-13 PROCEDURE — 3008F BODY MASS INDEX DOCD: CPT | Mod: CPTII,S$GLB,, | Performed by: NURSE PRACTITIONER

## 2024-11-13 PROCEDURE — 99214 OFFICE O/P EST MOD 30 MIN: CPT | Mod: S$GLB,,, | Performed by: NURSE PRACTITIONER

## 2024-11-13 RX ORDER — TRAMADOL HYDROCHLORIDE 50 MG/1
50 TABLET ORAL 2 TIMES DAILY PRN
Qty: 60 TABLET | Refills: 0 | Status: SHIPPED | OUTPATIENT
Start: 2024-11-13 | End: 2024-12-13

## 2024-11-13 NOTE — PROGRESS NOTES
[facility] -[facility] Patient Evaluation    Referred by: No ref. provider found   Reason for referral: * No diagnoses found *     CC:   Chief Complaint   Patient presents with    Follow-up     SP     Hip Pain     Right     Low-back Pain    Leg Pain     Right          5/3/2024    10:39 AM 4/2/2024    11:05 AM   Last 3 PDI Scores   Pain Disability Index (PDI) 10 42     Interval Update 11/13/2024: Patient return to clinic SP right intra-articular hip injection procedure done on 10/30/2024 with 95% relief for 5 days and then the patient felt like the pain returned.  She is reporting new pain in the right buttocks radiating down her right leg and sometimes affecting her right foot.  She is reporting paresthesia like symptoms in the right leg she also feels like the pain in her right buttocks as sharp which does not allow her to sit on her right side she has to lean to her left.  She denies any recent incident or trauma denies any profound weakness.  She reports having experiences in the past but the intensity of it exacerbated following her right intra-articular hip injection on 10/30/2024.    Interval history 05/03/2024:  72 year female presents today for a follow-up appointment she has a history of chronic right hip pain she is status post a right intra-articular hip injection with Dr. Li on 04/17/2024  that provided her 99% relief of her symptoms and improvement in her functionality.  She denies any new pain denies profound weakness she acknowledges that she will return to a home exercise plan that includes aqua therapy.  She will also restart her physical therapy.      Subjective 04/02/24  Chinyere Najera is a 74 y.o. female who presents complaining of right hip pain.  The pain radiates to the groin into the right knee.  Pain is worse with standing, walking and daily activities.  She was previously undergone intra-articular right hip injections with excellent relief ranging anywhere  from 8 months to 18 months.  She would like to repeat a hip injection.     Location: right hip pain   Onset: years, worse over last 6 months  Current Pain Score: 9/10  Daily Pain of Range: 9-10/10  Quality: Aching, Throbbing, Deep, and Sharp  Radiation:  Right groin and right knee  Worsened by: lying down, sitting, standing for more than a few minutes, walking for more than a few minutes, and daily activity.  Improved by: nothing    Patient denies night fever/night sweats, urinary incontinence, bowel incontinence, significant weight loss, significant motor weakness, and loss of sensations.    Previous Interventions:  -10/30/2024 : Right Hip Joint Injection 95% relief for 5 days  -04/17/2024-right intra-articular hip injection 99% relief  - several right intra-articular hip injections with Orthopedics with relief ranging from 8-18 months    Previous Therapies:  PT/OT: yes   Chiropractor:   HEP:  Yes - physical therapy twice weekly and aquatic exercise up to 4 times weekly  Relevant Surgery: no   Previous Medications:   - NSAIDS:  Diclofenac  - Muscle Relaxants:    - TCAs:   - SNRIs:   - Topicals:   - Anticonvulsants:  Lyrica   - Opioids:   - Adjuvants:  Medrol Dosepak    Current Pain Medications:   Robaxin  Voltaren  Ibuprofen    Review of Systems:  Review of Systems   Musculoskeletal:  Positive for joint pain.       GENERAL:  No weight loss, malaise or fevers.  HEENT:   No recent changes in vision or hearing  NECK:  No difficulty with swallowing. No stridor.   RESPIRATORY:  Negative for cough, wheezing or shortness of breath, patient denies any recent URI.  CARDIOVASCULAR:  Negative for chest pain, leg swelling or palpitations.  GI:  Negative for abdominal discomfort, blood in stools or black stools or change in bowel habits.  MUSCULOSKELETAL:  See HPI.  SKIN:  Negative for lesions, rash, and itching.  PSYCH:  No mood disorder or recent psychosocial stressors.    HEMATOLOGY/LYMPHOLOGY:  Negative for prolonged  bleeding, bruising easily or swollen nodes.  Patient is not currently taking any anti-coagulants  NEURO:   No history of headaches, syncope, paralysis, seizures or tremors.  All other reviewed and negative other than HPI.    History:  Current medications, allergies, medical history, surgical history,   family history, and social history were reviewed in the chart as marked.    Full Medication List:    Current Outpatient Medications:     albuterol (PROVENTIL/VENTOLIN HFA) 90 mcg/actuation inhaler, Inhale 2 puffs into the lungs., Disp: , Rfl:     alendronate (FOSAMAX) 70 MG tablet, Take 70 mg by mouth every 7 days., Disp: , Rfl:     amLODIPine (NORVASC) 5 MG tablet, Take 1 tablet by mouth once daily., Disp: , Rfl:     ascorbic acid, vitamin C, (VITAMIN C) 1000 MG tablet, Take 1,000 mg by mouth., Disp: , Rfl:     aspirin (ECOTRIN) 81 MG EC tablet, Take 81 mg by mouth once daily., Disp: , Rfl:     azelastine (ASTELIN) 137 mcg (0.1 %) nasal spray, 1 spray by Nasal route., Disp: , Rfl:     azilsartan med-chlorthalidone (EDARBYCLOR) 40-12.5 mg Tab, Take by mouth once daily., Disp: , Rfl:     calcium-vitamin D3 (CALCIUM 500 + D) 500 mg(1,250mg) -200 unit per tablet, Take by mouth once daily., Disp: , Rfl:     diclofenac (VOLTAREN) 75 MG EC tablet, Take 75 mg by mouth 2 (two) times daily., Disp: , Rfl:     ergocalciferol (ERGOCALCIFEROL) 50,000 unit Cap, Take 50,000 Units by mouth., Disp: , Rfl:     esomeprazole (NEXIUM) 40 MG capsule, Take 40 mg by mouth before breakfast., Disp: , Rfl:     famotidine (PEPCID) 20 MG tablet, Take 20 mg by mouth., Disp: , Rfl:     fexofenadine (ALLEGRA) 180 MG tablet, Take 180 mg by mouth once daily., Disp: , Rfl:     ibuprofen (ADVIL,MOTRIN) 800 MG tablet, Take 800 mg by mouth every 6 (six) hours as needed for Pain., Disp: , Rfl:     levocetirizine (XYZAL) 5 MG tablet, Take 10 mg by mouth., Disp: , Rfl:     methylPREDNISolone (MEDROL DOSEPACK) 4 mg tablet, use as directed, Disp: 1 each, Rfl:  "1    mycophenolate (CELLCEPT) 500 mg Tab, Take by mouth., Disp: , Rfl:     olmesartan-hydrochlorothiazide (BENICAR HCT) 40-25 mg per tablet, Take 1 tablet by mouth., Disp: , Rfl:     potassium chloride (MICRO-K) 10 MEQ CpSR, Take 10 mEq by mouth once daily., Disp: , Rfl:     pregabalin (LYRICA) 75 MG capsule, Take 75 mg by mouth 2 (two) times daily., Disp: , Rfl:     rosuvastatin (CRESTOR) 10 MG tablet, Take 10 mg by mouth., Disp: , Rfl:      Allergies:  Patient has no known allergies.     Medical History:   has a past medical history of Arthritis, GERD (gastroesophageal reflux disease), HTN (hypertension), Lupus, and Vertigo.    Surgical History:   has a past surgical history that includes Partial hysterectomy; Cholecystectomy; Injection of joint (Right, 4/17/2024); and Injection of joint (Right, 10/30/2024).    Family History:  family history is not on file.    Social History:   reports that she has never smoked. She does not have any smokeless tobacco history on file. She reports current alcohol use. She reports that she does not use drugs.    Physical Exam:  Vitals:    11/13/24 0841   BP: (!) 150/82   Pulse: 85   Weight: 103 kg (227 lb 1.2 oz)   Height: 5' 4" (1.626 m)   PainSc: 10-Worst pain ever         GENERAL: Well appearing, in no acute distress, alert and oriented x3.  PSYCH:  Mood and affect appropriate.  SKIN: Skin color, texture, turgor normal, no rashes or lesions.  HEAD/FACE:  Normocephalic, atraumatic. Cranial nerves grossly intact.  NECK: Normal ROM. Supple.   CV: RRR with palpation of the radial artery.  PULM: No evidence of respiratory difficulty, symmetric chest rise.  GI:  Soft and non-distended.  MSK: Straight leg raising is Positive on the Right to radicular pain. + pain to palpation over the facet joints of the lumbar spine. + pain with lumbar facet loading.  LAMAR test is positive for right hip pain. Pain with ROM of the Right Hip. Mild pain over the GBT bilaterally. No obvious deformities, " edema, or skin discoloration.  No atrophy or tone abnormalities are noted.   NEURO: Bilateral upper and lower extremity coordination and strength is symmetric.  No loss of sensation is noted.  MENTAL STATUS: A x O x 3, good concentration, speech is fluent and goal directed  MOTOR: 5/5 in bilateral lower extremity muscle groups  GAIT:  Antalgic.  Ambulates unassisted.    Imaging:  EXAMINATION: MRI lumbar spine without contrast     EXAMINATION DATE: 12/20/2023     COMPARISON: None     TECHNIQUE: Multiplanar multisequence images of the lumbar spine were obtained without the use contrast.     INDICATION: Pain in both arms     FINDINGS:     5 lumbar bodies. The lordotic curvature is normal. Grade 1 anterolisthesis of L4 and L5 without evidence for spondylolysis. Vertebral heights are maintained. Marrow signal pattern is normal. No evidence for compression informed, fracture, dislocation. Intervertebral disc space narrowing is identified at L4-L5 and L5-S1 with diffuse disc desiccation. Conus medullaris at the level of L1. The proximal spinal cord is normal caliber. The distal nerve roots are normal.     T12-L1: Small broad-based disc bulge with ligament flavum hypertrophy and degenerative changes facets. No evidence for central canal stenosis or neural foraminal narrowing.     L1-L2: Broad-based disc bulge with ligament flavum hypertrophy and degenerative changes facets. No evidence for central canal stenosis or neural foraminal narrowing.     L2-L3: Broad-based disc bulge with ligament flavum approach degenerative changes facets. Encroachment upon the left lateral recess is identified with impingement transcending L3 nerve root. No significant neural foraminal narrowing or central canal stenosis.     L3-L4: Broad-based disc bulge with ligament flavum hypertrophy and degenerative changes facets. Involvement of bilateral lateral recesses greater on the left right with impingement transcending L4 nerve roots. No evidence  for central canal stenosis. Minimal neural foraminal narrowing on the right.     L4-L5: Exaggerated broad-based disc bulge with ligament flavum hypertrophy and degenerative changes facets causing minimal moderate central canal stenosis. Involvement of the bilateral lateral recesses with impingement of the transcending L5 nerve roots. Moderate bilateral neural foraminal narrowing.     L5-S1: Central disc protrusion with effacement of ventral CSF. No evidence for central canal stenosis. Involvement of bilateral lateral recesses with impingement of the transcending S1 nerve roots. Minimal neural foraminal narrowing.     Degenerative changes of the SI joints. The visualized hollow and solid viscera grossly normal         XR HIPS BILATERAL 2 VIEW INCL AP PELVIS     CLINICAL HISTORY:  Pain in right hip     FINDINGS:  Bilateral two views hips to include pelvis.     Right: No fracture dislocation bone destruction seen.     Left: No fracture dislocation bone destruction seen.        Electronically signed by: Oziel Aranda MD  Date:                                            12/15/2023    Labs:  BMP  Lab Results   Component Value Date     09/01/2016    K 3.8 09/01/2016     09/01/2016    CO2 26 09/01/2016    BUN 13 09/01/2016    CREATININE 0.83 09/01/2016    CALCIUM 9.6 09/01/2016     Lab Results   Component Value Date    ALT 13 09/01/2016    AST 19 09/01/2016    ALKPHOS 126 09/01/2016    BILITOT 0.4 09/01/2016     Lab Results   Component Value Date    WBC 6.8 09/01/2016    HGB 12.0 09/01/2016    HCT 38.5 09/01/2016    MCV 82.2 09/01/2016     09/01/2016         Assessment:  Problem List Items Addressed This Visit    None  Visit Diagnoses       Lumbar radiculopathy, acute    -  Primary    Relevant Orders    MRI Lumbar Spine Without Contrast    Degeneration of intervertebral disc of lumbar region with discogenic back pain and lower extremity pain        Relevant Orders    MRI Lumbar Spine Without Contrast     Dorsalgia, unspecified        Relevant Orders    MRI Lumbar Spine Without Contrast    Chronic pain syndrome        Lumbar spondylosis                  04/02/2024 - Chinyere Najera is a 74 y.o. female who  has a past medical history of Arthritis, GERD (gastroesophageal reflux disease), HTN (hypertension), Lupus, and Vertigo.  By history and examination this patient has chronic right hip pain as well as low back pain with radiculopathy.  The underlying cause is hip osteoarthritis, degenerative disc disease and foraminal stenosis.  By history and exam of the bulk of her pain appears to be from the hip joint itself however I do feel like there is some contribution from her low back.  Lumbar MRI and hip x-rays were reviewed.  We discussed the underlying diagnoses and multiple treatment options including non-opioid medications, interventional procedures, physical therapy, home exercise, core muscle enhancement, and weight loss.  I will schedule the patient for a right intra-articular hip injection.  In the future she may also benefit from a lumbar epidural steroid injection as I feel this is also contributing to her pain.  The risks and benefits of each treatment option were discussed and all questions were answered.      05/03/[id number]-dgzp-old female with a history of chronic right hip pain she also reported previously low back and radiculopathy symptoms.  Recently provided a right intra-articular hip injection on 04/17/2024 that provided her 99% relief her hip pain based on review of her recent images is related to hip osteoarthritis.  Her low back pain could be stemming from degenerative disc disease and foraminal stenosis.  For now we will hold off on any more injections considering her substantial relief with the right hip injection recommended she return to physical therapy as well as establish a home exercise plan.  Patient verbalized understanding and agreed she did inquire about repeating a right intra-articular hip  injection prior to a vacation in November recommend that she could call the office to reschedule.    11/13/2024-Chinyere Najera is a 74 y.o. female who  has a past medical history of Arthritis, GERD (gastroesophageal reflux disease), HTN (hypertension), Lupus, and Vertigo.  By history and examination this patient has chronic low back pain with RIGHT radiculopathy.  The underlying cause cause is facet arthritis, degenerative disc disease, foraminal stenosis, central canal stenosis, muscle dysfunction, and deconditioning.  Pathology is confirmed by imaging.  We discussed the underlying diagnoses and multiple treatment options including non-opioid medications, opioid medications, interventional procedures, physical therapy, home exercise, core muscle enhancement, activity modification, and weight loss.  The risks and benefits of each treatment option were discussed and all questions were answered.        Treatment Plan:   Procedures:  none at that time.  Consider lumbar TFESI pending updated lumbar MRI   PT/OT/HEP: I have stressed the importance of physical activity and a home exercise plan to help with pain and improve health.  Continue with home exercise regimen and physical therapy  Medications:    - short term prescription of tramadol 50 mg b.i.d. p.r.n. 60 tablets (patient is scheduled for a cruise we will be unable to provide her the epidural prior explained to patient this is a short term prescription and we will not fill chronically)   -  Reviewed and consistent with medication use as prescribed.  Imaging:  Update lumbar MRI today  Follow Up:  3-4 months or sooner if needed.    YANG Zamarripa  Interventional Pain Management    Disclaimer: This note was partly generated using dictation software which may occasionally result in transcription errors.

## 2024-11-13 NOTE — TELEPHONE ENCOUNTER
----- Message from CHERISE Zamarripa sent at 11/13/2024  9:09 AM CST -----  Regarding: short term Rx  Tramadol 50 mg BID #60 for 30 days no refills.

## 2024-12-09 ENCOUNTER — HOSPITAL ENCOUNTER (OUTPATIENT)
Dept: RADIOLOGY | Facility: HOSPITAL | Age: 74
Discharge: HOME | End: 2024-12-09
Attending: NURSE PRACTITIONER
Payer: MEDICARE

## 2024-12-09 PROCEDURE — 72148 MRI LUMBAR SPINE W/O DYE: CPT | Mod: 26,,, | Performed by: RADIOLOGY

## 2024-12-09 PROCEDURE — 72148 MRI LUMBAR SPINE W/O DYE: CPT | Mod: TC

## 2024-12-17 ENCOUNTER — OFFICE VISIT (OUTPATIENT)
Dept: PAIN MEDICINE | Facility: CLINIC | Age: 74
End: 2024-12-17
Payer: MEDICARE

## 2024-12-17 ENCOUNTER — TELEPHONE (OUTPATIENT)
Dept: PAIN MEDICINE | Facility: CLINIC | Age: 74
End: 2024-12-17
Payer: MEDICARE

## 2024-12-17 VITALS
WEIGHT: 222.31 LBS | SYSTOLIC BLOOD PRESSURE: 149 MMHG | HEIGHT: 64 IN | HEART RATE: 78 BPM | BODY MASS INDEX: 37.95 KG/M2 | DIASTOLIC BLOOD PRESSURE: 78 MMHG

## 2024-12-17 DIAGNOSIS — G89.4 CHRONIC PAIN SYNDROME: ICD-10-CM

## 2024-12-17 DIAGNOSIS — M54.16 LUMBAR RADICULOPATHY, ACUTE: Primary | ICD-10-CM

## 2024-12-17 DIAGNOSIS — M51.362 DEGENERATION OF INTERVERTEBRAL DISC OF LUMBAR REGION WITH DISCOGENIC BACK PAIN AND LOWER EXTREMITY PAIN: ICD-10-CM

## 2024-12-17 PROCEDURE — 99999 PR PBB SHADOW E&M-EST. PATIENT-LVL IV: CPT | Mod: PBBFAC,,, | Performed by: NURSE PRACTITIONER

## 2024-12-17 PROCEDURE — 3008F BODY MASS INDEX DOCD: CPT | Mod: CPTII,S$GLB,, | Performed by: NURSE PRACTITIONER

## 2024-12-17 PROCEDURE — 3078F DIAST BP <80 MM HG: CPT | Mod: CPTII,S$GLB,, | Performed by: NURSE PRACTITIONER

## 2024-12-17 PROCEDURE — 1160F RVW MEDS BY RX/DR IN RCRD: CPT | Mod: CPTII,S$GLB,, | Performed by: NURSE PRACTITIONER

## 2024-12-17 PROCEDURE — G2211 COMPLEX E/M VISIT ADD ON: HCPCS | Mod: S$GLB,,, | Performed by: NURSE PRACTITIONER

## 2024-12-17 PROCEDURE — 3077F SYST BP >= 140 MM HG: CPT | Mod: CPTII,S$GLB,, | Performed by: NURSE PRACTITIONER

## 2024-12-17 PROCEDURE — 1125F AMNT PAIN NOTED PAIN PRSNT: CPT | Mod: CPTII,S$GLB,, | Performed by: NURSE PRACTITIONER

## 2024-12-17 PROCEDURE — 1159F MED LIST DOCD IN RCRD: CPT | Mod: CPTII,S$GLB,, | Performed by: NURSE PRACTITIONER

## 2024-12-17 PROCEDURE — 99214 OFFICE O/P EST MOD 30 MIN: CPT | Mod: S$GLB,,, | Performed by: NURSE PRACTITIONER

## 2024-12-17 NOTE — TELEPHONE ENCOUNTER
----- Message from CHERISE Zamarripa sent at 2024 10:01 AM CST -----  Regarding: Order for VIJAY CONNER    Patient Name: VIJAY CONNER([id number])  Sex: Female  : 1950      PCP: TRACIE, PRIMARY DOCTOR    Center: [facility]     Types of orders made on 2024: Procedure Request    Order Date:2024  Ordering User:DK HOLLINGSWORTH [[id number]]  Encounter Provider:Dk Hollingsworth FNP [[id number]]  Authorizing Provider: Dk Hollingsworth FNP [[id number]]  Supervising Provider:ROJELIO WILLAMS [[id number]]  Type of Supervision:Collaborating Physician  Department:[facility] PAIN MANAGEMENT[[id number]]    Common Order Information  Procedure -> Transforaminal Injection (Specify level and laterality) Cmt: Right             L4/5 L5-S1    Pre-op Diagnosis -> Spinal stenosis at L4-L5 level       -> Lumbar radicular syndrome       -> DDD (degenerative disc disease), lumbar     Order Specific Information  Order: Procedure Request Order for Pain Management [Custom: [id number]]  Order #:          [id number]: 1 FUTURE    Priority: Routine  Class: Clinic Performed    Future Order Information      Expires on:2025            Expected by:2024                   Associated Diagnoses      M54.16 Lumbar radiculopathy, acute      G89.4 Chronic pain syndrome      M51.362 Degeneration of intervertebral disc of lumbar region with       discogenic back pain and lower extremity pain      Physician -> Jen         Is patient on anti-coagulants? -> No         Facility Name: -> [place]         Follow-up: -> 2 weeks           Priority: Routine  Class: Clinic Performed    Future Order Information      Expires on:2025            Expected by:2024                   Associated Diagnoses      M54.16 Lumbar radiculopathy, acute      G89.4 Chronic pain syndrome      M51.362 Degeneration of intervertebral disc of lumbar region with       discogenic back pain and lower extremity pain      Procedure -> Transforaminal Injection  (Specify level and laterality) Cmt:                 Right L4/5 L5-S1        Physician -> Gelter         Is patient on anti-coagulants? -> No         Pre-op Diagnosis -> Spinal stenosis at L4-L5 level           -> Lumbar radicular syndrome           -> DDD (degenerative disc disease), lumbar         Facility Name: -> [place]         Follow-up: -> 2 weeks

## 2024-12-17 NOTE — TELEPHONE ENCOUNTER
----- Message from Med Assistant Baldwin sent at 12/17/2024 12:07 PM CST -----  Regarding: FW: Callback  Contact: [phone]  Dylan Mansfield, please assist.   Ty  ----- Message -----  From: Mini Harris  Sent: 12/17/2024  12:07 PM CST  To: Jen Sanchez Staff  Subject: Callback                                         Patient calling requesting a callback from Shyla Epps in regards to code needing for pricing . Please call back as soon as possible.

## 2024-12-17 NOTE — TELEPHONE ENCOUNTER
----- Message from CHERISE Zamarripa sent at 2024 10:01 AM CST -----  Regarding: Order for VIJAY CONNER    Patient Name: VIJAY CONNER([id number])  Sex: Female  : 1950      PCP: TRACIE, PRIMARY DOCTOR    Center: [facility]     Types of orders made on 2024: Procedure Request    Order Date:2024  Ordering User:DK HOLLINGSWORTH [[id number]]  Encounter Provider:Dk Hollingsworth FNP [[id number]]  Authorizing Provider: Dk Hollingsworth FNP [[id number]]  Supervising Provider:ROJELIO WILLAMS [[id number]]  Type of Supervision:Collaborating Physician  Department:[facility] PAIN MANAGEMENT[[id number]]    Common Order Information  Procedure -> Transforaminal Injection (Specify level and laterality) Cmt: Right             L4/5 L5-S1    Pre-op Diagnosis -> Spinal stenosis at L4-L5 level       -> Lumbar radicular syndrome       -> DDD (degenerative disc disease), lumbar     Order Specific Information  Order: Procedure Request Order for Pain Management [Custom: [id number]]  Order #:          [id number]: 1 FUTURE    Priority: Routine  Class: Clinic Performed    Future Order Information      Expires on:2025            Expected by:2024                   Associated Diagnoses      M54.16 Lumbar radiculopathy, acute      G89.4 Chronic pain syndrome      M51.362 Degeneration of intervertebral disc of lumbar region with       discogenic back pain and lower extremity pain      Physician -> Jen         Is patient on anti-coagulants? -> No         Facility Name: -> [place]         Follow-up: -> 2 weeks           Priority: Routine  Class: Clinic Performed    Future Order Information      Expires on:2025            Expected by:2024                   Associated Diagnoses      M54.16 Lumbar radiculopathy, acute      G89.4 Chronic pain syndrome      M51.362 Degeneration of intervertebral disc of lumbar region with       discogenic back pain and lower extremity pain      Procedure -> Transforaminal Injection  (Specify level and laterality) Cmt:                 Right L4/5 L5-S1        Physician -> Gelter         Is patient on anti-coagulants? -> No         Pre-op Diagnosis -> Spinal stenosis at L4-L5 level           -> Lumbar radicular syndrome           -> DDD (degenerative disc disease), lumbar         Facility Name: -> [place]         Follow-up: -> 2 weeks

## 2024-12-17 NOTE — H&P (VIEW-ONLY)
[facility] Patient Evaluation    Referred by: Aaareferral Self   Reason for referral: * No diagnoses found *     CC:   No chief complaint on file.        11/13/2024     8:42 AM 5/3/2024    10:39 AM 4/2/2024    11:05 AM   Last 3 PDI Scores   Pain Disability Index (PDI) 60 10 42     Interval Update 12/17/2024: Patient return to clinic to discuss MRI results.  Patient reports continued pain in the low back with radiating symptoms into her right buttocks down her right lateral and posterior leg into her right foot.  Patient reports right leg pain accompanies low back pain consistently.  Patient denies any profound weakness denies any recent incident or trauma patient does report paresthesia like symptoms in the right leg she describes this as tingling in her right foot and right lower extremity).  Patient is established office and has a previously provided a right intra-articular hip injection with mild-to-moderate relief.    Interval Update 11/13/2024: Patient return to clinic SP right intra-articular hip injection procedure done on 10/30/2024 with 95% relief for 5 days and then the patient felt like the pain returned.  She is reporting new pain in the right buttocks radiating down her right leg and sometimes affecting her right foot.  She is reporting paresthesia like symptoms in the right leg she also feels like the pain in her right buttocks as sharp which does not allow her to sit on her right side she has to lean to her left.  She denies any recent incident or trauma denies any profound weakness.  She reports having experiences in the past but the intensity of it exacerbated following her right intra-articular hip injection on 10/30/2024.    Interval history 05/03/2024:  72 year female presents today for a follow-up appointment she has a history of chronic right hip pain she is status post a right intra-articular hip injection with Dr. Li on 04/17/2024  that  provided her 99% relief of her symptoms and improvement in her functionality.  She denies any new pain denies profound weakness she acknowledges that she will return to a home exercise plan that includes aqua therapy.  She will also restart her physical therapy.      Subjective 04/02/24  Chinyere Najera is a 74 y.o. female who presents complaining of right hip pain.  The pain radiates to the groin into the right knee.  Pain is worse with standing, walking and daily activities.  She was previously undergone intra-articular right hip injections with excellent relief ranging anywhere from 8 months to 18 months.  She would like to repeat a hip injection.     Location: right hip pain   Onset: years, worse over last 6 months  Current Pain Score: 9/10  Daily Pain of Range: 9-10/10  Quality: Aching, Throbbing, Deep, and Sharp  Radiation:  Right groin and right knee  Worsened by: lying down, sitting, standing for more than a few minutes, walking for more than a few minutes, and daily activity.  Improved by: nothing    Patient denies night fever/night sweats, urinary incontinence, bowel incontinence, significant weight loss, significant motor weakness, and loss of sensations.    Previous Interventions:  -10/30/2024 : Right Hip Joint Injection 95% relief for 5 days  -04/17/2024-right intra-articular hip injection 99% relief  - several right intra-articular hip injections with Orthopedics with relief ranging from 8-18 months    Previous Therapies:  PT/OT: yes   Chiropractor:   HEP:  Yes - physical therapy twice weekly and aquatic exercise up to 4 times weekly  Relevant Surgery: no   Previous Medications:   - NSAIDS:  Diclofenac  - Muscle Relaxants:    - TCAs:   - SNRIs:   - Topicals:   - Anticonvulsants:  Lyrica   - Opioids:   - Adjuvants:  Medrol Dosepak    Current Pain Medications:   Robaxin  Voltaren  Ibuprofen    Review of Systems:  Review of Systems   Musculoskeletal:  Positive for joint pain.       GENERAL:  No weight loss,  malaise or fevers.  HEENT:   No recent changes in vision or hearing  NECK:  No difficulty with swallowing. No stridor.   RESPIRATORY:  Negative for cough, wheezing or shortness of breath, patient denies any recent URI.  CARDIOVASCULAR:  Negative for chest pain, leg swelling or palpitations.  GI:  Negative for abdominal discomfort, blood in stools or black stools or change in bowel habits.  MUSCULOSKELETAL:  See HPI.  SKIN:  Negative for lesions, rash, and itching.  PSYCH:  No mood disorder or recent psychosocial stressors.    HEMATOLOGY/LYMPHOLOGY:  Negative for prolonged bleeding, bruising easily or swollen nodes.  Patient is not currently taking any anti-coagulants  NEURO:   No history of headaches, syncope, paralysis, seizures or tremors.  All other reviewed and negative other than HPI.    History:  Current medications, allergies, medical history, surgical history,   family history, and social history were reviewed in the chart as marked.    Full Medication List:    Current Outpatient Medications:     albuterol (PROVENTIL/VENTOLIN HFA) 90 mcg/actuation inhaler, Inhale 2 puffs into the lungs., Disp: , Rfl:     alendronate (FOSAMAX) 70 MG tablet, Take 70 mg by mouth every 7 days., Disp: , Rfl:     amLODIPine (NORVASC) 5 MG tablet, Take 1 tablet by mouth once daily., Disp: , Rfl:     ascorbic acid, vitamin C, (VITAMIN C) 1000 MG tablet, Take 1,000 mg by mouth., Disp: , Rfl:     aspirin (ECOTRIN) 81 MG EC tablet, Take 81 mg by mouth once daily., Disp: , Rfl:     azelastine (ASTELIN) 137 mcg (0.1 %) nasal spray, 1 spray by Nasal route., Disp: , Rfl:     azilsartan med-chlorthalidone (EDARBYCLOR) 40-12.5 mg Tab, Take by mouth once daily., Disp: , Rfl:     calcium-vitamin D3 (CALCIUM 500 + D) 500 mg(1,250mg) -200 unit per tablet, Take by mouth once daily., Disp: , Rfl:     diclofenac (VOLTAREN) 75 MG EC tablet, Take 75 mg by mouth 2 (two) times daily., Disp: , Rfl:     ergocalciferol (ERGOCALCIFEROL) 50,000 unit Cap,  Take 50,000 Units by mouth., Disp: , Rfl:     esomeprazole (NEXIUM) 40 MG capsule, Take 40 mg by mouth before breakfast., Disp: , Rfl:     famotidine (PEPCID) 20 MG tablet, Take 20 mg by mouth., Disp: , Rfl:     fexofenadine (ALLEGRA) 180 MG tablet, Take 180 mg by mouth once daily., Disp: , Rfl:     ibuprofen (ADVIL,MOTRIN) 800 MG tablet, Take 800 mg by mouth every 6 (six) hours as needed for Pain., Disp: , Rfl:     levocetirizine (XYZAL) 5 MG tablet, Take 10 mg by mouth., Disp: , Rfl:     methylPREDNISolone (MEDROL DOSEPACK) 4 mg tablet, use as directed, Disp: 1 each, Rfl: 1    mycophenolate (CELLCEPT) 500 mg Tab, Take by mouth., Disp: , Rfl:     olmesartan-hydrochlorothiazide (BENICAR HCT) 40-25 mg per tablet, Take 1 tablet by mouth., Disp: , Rfl:     potassium chloride (MICRO-K) 10 MEQ CpSR, Take 10 mEq by mouth once daily., Disp: , Rfl:     pregabalin (LYRICA) 75 MG capsule, Take 75 mg by mouth 2 (two) times daily., Disp: , Rfl:     rosuvastatin (CRESTOR) 10 MG tablet, Take 10 mg by mouth., Disp: , Rfl:      Allergies:  Patient has no known allergies.     Medical History:   has a past medical history of Arthritis, GERD (gastroesophageal reflux disease), HTN (hypertension), Lupus, and Vertigo.    Surgical History:   has a past surgical history that includes Partial hysterectomy; Cholecystectomy; Injection of joint (Right, 4/17/2024); and Injection of joint (Right, 10/30/2024).    Family History:  family history is not on file.    Social History:   reports that she has never smoked. She does not have any smokeless tobacco history on file. She reports current alcohol use. She reports that she does not use drugs.    Physical Exam:  There were no vitals filed for this visit.        GENERAL: Well appearing, in no acute distress, alert and oriented x3.  PSYCH:  Mood and affect appropriate.  SKIN: Skin color, texture, turgor normal, no rashes or lesions.  HEAD/FACE:  Normocephalic, atraumatic. Cranial nerves grossly  intact.  NECK: Normal ROM. Supple.   CV: RRR with palpation of the radial artery.  PULM: No evidence of respiratory difficulty, symmetric chest rise.  GI:  Soft and non-distended.  MSK: Straight leg raising is Positive on the Right to radicular pain. + pain to palpation over the facet joints of the lumbar spine. + pain with lumbar facet loading.  LAMAR test is positive for right hip pain. Pain with ROM of the Right Hip. Mild pain over the GBT bilaterally. No obvious deformities, edema, or skin discoloration.  No atrophy or tone abnormalities are noted.   NEURO: Bilateral upper and lower extremity coordination and strength is symmetric.  No loss of sensation is noted.  MENTAL STATUS: A x O x 3, good concentration, speech is fluent and goal directed  MOTOR: 5/5 in bilateral lower extremity muscle groups  GAIT:  Antalgic.  Ambulates unassisted.    Imaging:  EXAMINATION: MRI lumbar spine without contrast     EXAMINATION DATE: 12/20/2023     COMPARISON: None     TECHNIQUE: Multiplanar multisequence images of the lumbar spine were obtained without the use contrast.     INDICATION: Pain in both arms     FINDINGS:     5 lumbar bodies. The lordotic curvature is normal. Grade 1 anterolisthesis of L4 and L5 without evidence for spondylolysis. Vertebral heights are maintained. Marrow signal pattern is normal. No evidence for compression informed, fracture, dislocation. Intervertebral disc space narrowing is identified at L4-L5 and L5-S1 with diffuse disc desiccation. Conus medullaris at the level of L1. The proximal spinal cord is normal caliber. The distal nerve roots are normal.     T12-L1: Small broad-based disc bulge with ligament flavum hypertrophy and degenerative changes facets. No evidence for central canal stenosis or neural foraminal narrowing.     L1-L2: Broad-based disc bulge with ligament flavum hypertrophy and degenerative changes facets. No evidence for central canal stenosis or neural foraminal narrowing.      L2-L3: Broad-based disc bulge with ligament flavum approach degenerative changes facets. Encroachment upon the left lateral recess is identified with impingement transcending L3 nerve root. No significant neural foraminal narrowing or central canal stenosis.     L3-L4: Broad-based disc bulge with ligament flavum hypertrophy and degenerative changes facets. Involvement of bilateral lateral recesses greater on the left right with impingement transcending L4 nerve roots. No evidence for central canal stenosis. Minimal neural foraminal narrowing on the right.     L4-L5: Exaggerated broad-based disc bulge with ligament flavum hypertrophy and degenerative changes facets causing minimal moderate central canal stenosis. Involvement of the bilateral lateral recesses with impingement of the transcending L5 nerve roots. Moderate bilateral neural foraminal narrowing.     L5-S1: Central disc protrusion with effacement of ventral CSF. No evidence for central canal stenosis. Involvement of bilateral lateral recesses with impingement of the transcending S1 nerve roots. Minimal neural foraminal narrowing.     Degenerative changes of the SI joints. The visualized hollow and solid viscera grossly normal         XR HIPS BILATERAL 2 VIEW INCL AP PELVIS     CLINICAL HISTORY:  Pain in right hip     FINDINGS:  Bilateral two views hips to include pelvis.     Right: No fracture dislocation bone destruction seen.     Left: No fracture dislocation bone destruction seen.        Electronically signed by: Oziel Aranda MD  Date:                                            12/15/2023    Labs:  BMP  Lab Results   Component Value Date     09/01/2016    K 3.8 09/01/2016     09/01/2016    CO2 26 09/01/2016    BUN 13 09/01/2016    CREATININE 0.83 09/01/2016    CALCIUM 9.6 09/01/2016     Lab Results   Component Value Date    ALT 13 09/01/2016    AST 19 09/01/2016    ALKPHOS 126 09/01/2016    BILITOT 0.4 09/01/2016     Lab Results    Component Value Date    WBC 6.8 09/01/2016    HGB 12.0 09/01/2016    HCT 38.5 09/01/2016    MCV 82.2 09/01/2016     09/01/2016         Assessment:  Problem List Items Addressed This Visit    None          04/02/2024 - Chinyere Najera is a 74 y.o. female who  has a past medical history of Arthritis, GERD (gastroesophageal reflux disease), HTN (hypertension), Lupus, and Vertigo.  By history and examination this patient has chronic right hip pain as well as low back pain with radiculopathy.  The underlying cause is hip osteoarthritis, degenerative disc disease and foraminal stenosis.  By history and exam of the bulk of her pain appears to be from the hip joint itself however I do feel like there is some contribution from her low back.  Lumbar MRI and hip x-rays were reviewed.  We discussed the underlying diagnoses and multiple treatment options including non-opioid medications, interventional procedures, physical therapy, home exercise, core muscle enhancement, and weight loss.  I will schedule the patient for a right intra-articular hip injection.  In the future she may also benefit from a lumbar epidural steroid injection as I feel this is also contributing to her pain.  The risks and benefits of each treatment option were discussed and all questions were answered.      [id number]-old female with a history of chronic right hip pain she also reported previously low back and radiculopathy symptoms.  Recently provided a right intra-articular hip injection on 04/17/2024 that provided her 99% relief her hip pain based on review of her recent images is related to hip osteoarthritis.  Her low back pain could be stemming from degenerative disc disease and foraminal stenosis.  For now we will hold off on any more injections considering her substantial relief with the right hip injection recommended she return to physical therapy as well as establish a home exercise plan.  Patient verbalized understanding and  agreed she did inquire about repeating a right intra-articular hip injection prior to a vacation in November recommend that she could call the office to reschedule.    11/13/2024-Chinyere Najera is a 74 y.o. female who  has a past medical history of Arthritis, GERD (gastroesophageal reflux disease), HTN (hypertension), Lupus, and Vertigo.  By history and examination this patient has chronic low back pain with RIGHT radiculopathy.  The underlying cause cause is facet arthritis, degenerative disc disease, foraminal stenosis, central canal stenosis, muscle dysfunction, and deconditioning.  Pathology is confirmed by imaging.  We discussed the underlying diagnoses and multiple treatment options including non-opioid medications, opioid medications, interventional procedures, physical therapy, home exercise, core muscle enhancement, activity modification, and weight loss.  The risks and benefits of each treatment option were discussed and all questions were answered.        12/17/2024-Chinyere Najera is a 74 y.o. female who  has a past medical history of Arthritis, GERD (gastroesophageal reflux disease), HTN (hypertension), Lupus, and Vertigo.  By history and examination this patient has chronic low back pain with radiculopathy.  The underlying cause cause is facet arthritis, degenerative disc disease, foraminal stenosis, central canal stenosis, and deconditioning.  Pathology is confirmed by imaging.  We discussed the underlying diagnoses and multiple treatment options including non-opioid medications, interventional procedures, physical therapy, home exercise, core muscle enhancement, activity modification, and weight loss.  The risks and benefits of each treatment option were discussed and all questions were answered.        Treatment Plan:   Procedures:  Scheduled for a right L4-5 and L5-S1 TF JUDIT  PT/OT/HEP: I have stressed the importance of physical activity and a home exercise plan to help with pain and improve health.   Continue with home exercise regimen and physical therapy  Medications:  Continue medications as previously prescribed   -  Reviewed and consistent with medication use as prescribed.  Imaging:  Previous imaging reviewed and discussed with the patient in detail using spine model and images from her chart.  Follow Up:  2- 3 weeks after injection    STEVE ZamarripaC  [facility]    Disclaimer: This note was partly generated using dictation software which may occasionally result in transcription errors.

## 2024-12-17 NOTE — PROGRESS NOTES
[facility] Patient Evaluation    Referred by: Aaareferral Self   Reason for referral: * No diagnoses found *     CC:   No chief complaint on file.        11/13/2024     8:42 AM 5/3/2024    10:39 AM 4/2/2024    11:05 AM   Last 3 PDI Scores   Pain Disability Index (PDI) 60 10 42     Interval Update 12/17/2024: Patient return to clinic to discuss MRI results.  Patient reports continued pain in the low back with radiating symptoms into her right buttocks down her right lateral and posterior leg into her right foot.  Patient reports right leg pain accompanies low back pain consistently.  Patient denies any profound weakness denies any recent incident or trauma patient does report paresthesia like symptoms in the right leg she describes this as tingling in her right foot and right lower extremity).  Patient is established office and has a previously provided a right intra-articular hip injection with mild-to-moderate relief.    Interval Update 11/13/2024: Patient return to clinic SP right intra-articular hip injection procedure done on 10/30/2024 with 95% relief for 5 days and then the patient felt like the pain returned.  She is reporting new pain in the right buttocks radiating down her right leg and sometimes affecting her right foot.  She is reporting paresthesia like symptoms in the right leg she also feels like the pain in her right buttocks as sharp which does not allow her to sit on her right side she has to lean to her left.  She denies any recent incident or trauma denies any profound weakness.  She reports having experiences in the past but the intensity of it exacerbated following her right intra-articular hip injection on 10/30/2024.    Interval history 05/03/2024:  72 year female presents today for a follow-up appointment she has a history of chronic right hip pain she is status post a right intra-articular hip injection with Dr. Li on 04/17/2024  that  provided her 99% relief of her symptoms and improvement in her functionality.  She denies any new pain denies profound weakness she acknowledges that she will return to a home exercise plan that includes aqua therapy.  She will also restart her physical therapy.      Subjective 04/02/24  Chinyere Najera is a 74 y.o. female who presents complaining of right hip pain.  The pain radiates to the groin into the right knee.  Pain is worse with standing, walking and daily activities.  She was previously undergone intra-articular right hip injections with excellent relief ranging anywhere from 8 months to 18 months.  She would like to repeat a hip injection.     Location: right hip pain   Onset: years, worse over last 6 months  Current Pain Score: 9/10  Daily Pain of Range: 9-10/10  Quality: Aching, Throbbing, Deep, and Sharp  Radiation:  Right groin and right knee  Worsened by: lying down, sitting, standing for more than a few minutes, walking for more than a few minutes, and daily activity.  Improved by: nothing    Patient denies night fever/night sweats, urinary incontinence, bowel incontinence, significant weight loss, significant motor weakness, and loss of sensations.    Previous Interventions:  -10/30/2024 : Right Hip Joint Injection 95% relief for 5 days  -04/17/2024-right intra-articular hip injection 99% relief  - several right intra-articular hip injections with Orthopedics with relief ranging from 8-18 months    Previous Therapies:  PT/OT: yes   Chiropractor:   HEP:  Yes - physical therapy twice weekly and aquatic exercise up to 4 times weekly  Relevant Surgery: no   Previous Medications:   - NSAIDS:  Diclofenac  - Muscle Relaxants:    - TCAs:   - SNRIs:   - Topicals:   - Anticonvulsants:  Lyrica   - Opioids:   - Adjuvants:  Medrol Dosepak    Current Pain Medications:   Robaxin  Voltaren  Ibuprofen    Review of Systems:  Review of Systems   Musculoskeletal:  Positive for joint pain.       GENERAL:  No weight loss,  malaise or fevers.  HEENT:   No recent changes in vision or hearing  NECK:  No difficulty with swallowing. No stridor.   RESPIRATORY:  Negative for cough, wheezing or shortness of breath, patient denies any recent URI.  CARDIOVASCULAR:  Negative for chest pain, leg swelling or palpitations.  GI:  Negative for abdominal discomfort, blood in stools or black stools or change in bowel habits.  MUSCULOSKELETAL:  See HPI.  SKIN:  Negative for lesions, rash, and itching.  PSYCH:  No mood disorder or recent psychosocial stressors.    HEMATOLOGY/LYMPHOLOGY:  Negative for prolonged bleeding, bruising easily or swollen nodes.  Patient is not currently taking any anti-coagulants  NEURO:   No history of headaches, syncope, paralysis, seizures or tremors.  All other reviewed and negative other than HPI.    History:  Current medications, allergies, medical history, surgical history,   family history, and social history were reviewed in the chart as marked.    Full Medication List:    Current Outpatient Medications:     albuterol (PROVENTIL/VENTOLIN HFA) 90 mcg/actuation inhaler, Inhale 2 puffs into the lungs., Disp: , Rfl:     alendronate (FOSAMAX) 70 MG tablet, Take 70 mg by mouth every 7 days., Disp: , Rfl:     amLODIPine (NORVASC) 5 MG tablet, Take 1 tablet by mouth once daily., Disp: , Rfl:     ascorbic acid, vitamin C, (VITAMIN C) 1000 MG tablet, Take 1,000 mg by mouth., Disp: , Rfl:     aspirin (ECOTRIN) 81 MG EC tablet, Take 81 mg by mouth once daily., Disp: , Rfl:     azelastine (ASTELIN) 137 mcg (0.1 %) nasal spray, 1 spray by Nasal route., Disp: , Rfl:     azilsartan med-chlorthalidone (EDARBYCLOR) 40-12.5 mg Tab, Take by mouth once daily., Disp: , Rfl:     calcium-vitamin D3 (CALCIUM 500 + D) 500 mg(1,250mg) -200 unit per tablet, Take by mouth once daily., Disp: , Rfl:     diclofenac (VOLTAREN) 75 MG EC tablet, Take 75 mg by mouth 2 (two) times daily., Disp: , Rfl:     ergocalciferol (ERGOCALCIFEROL) 50,000 unit Cap,  Take 50,000 Units by mouth., Disp: , Rfl:     esomeprazole (NEXIUM) 40 MG capsule, Take 40 mg by mouth before breakfast., Disp: , Rfl:     famotidine (PEPCID) 20 MG tablet, Take 20 mg by mouth., Disp: , Rfl:     fexofenadine (ALLEGRA) 180 MG tablet, Take 180 mg by mouth once daily., Disp: , Rfl:     ibuprofen (ADVIL,MOTRIN) 800 MG tablet, Take 800 mg by mouth every 6 (six) hours as needed for Pain., Disp: , Rfl:     levocetirizine (XYZAL) 5 MG tablet, Take 10 mg by mouth., Disp: , Rfl:     methylPREDNISolone (MEDROL DOSEPACK) 4 mg tablet, use as directed, Disp: 1 each, Rfl: 1    mycophenolate (CELLCEPT) 500 mg Tab, Take by mouth., Disp: , Rfl:     olmesartan-hydrochlorothiazide (BENICAR HCT) 40-25 mg per tablet, Take 1 tablet by mouth., Disp: , Rfl:     potassium chloride (MICRO-K) 10 MEQ CpSR, Take 10 mEq by mouth once daily., Disp: , Rfl:     pregabalin (LYRICA) 75 MG capsule, Take 75 mg by mouth 2 (two) times daily., Disp: , Rfl:     rosuvastatin (CRESTOR) 10 MG tablet, Take 10 mg by mouth., Disp: , Rfl:      Allergies:  Patient has no known allergies.     Medical History:   has a past medical history of Arthritis, GERD (gastroesophageal reflux disease), HTN (hypertension), Lupus, and Vertigo.    Surgical History:   has a past surgical history that includes Partial hysterectomy; Cholecystectomy; Injection of joint (Right, 4/17/2024); and Injection of joint (Right, 10/30/2024).    Family History:  family history is not on file.    Social History:   reports that she has never smoked. She does not have any smokeless tobacco history on file. She reports current alcohol use. She reports that she does not use drugs.    Physical Exam:  There were no vitals filed for this visit.        GENERAL: Well appearing, in no acute distress, alert and oriented x3.  PSYCH:  Mood and affect appropriate.  SKIN: Skin color, texture, turgor normal, no rashes or lesions.  HEAD/FACE:  Normocephalic, atraumatic. Cranial nerves grossly  intact.  NECK: Normal ROM. Supple.   CV: RRR with palpation of the radial artery.  PULM: No evidence of respiratory difficulty, symmetric chest rise.  GI:  Soft and non-distended.  MSK: Straight leg raising is Positive on the Right to radicular pain. + pain to palpation over the facet joints of the lumbar spine. + pain with lumbar facet loading.  LAMAR test is positive for right hip pain. Pain with ROM of the Right Hip. Mild pain over the GBT bilaterally. No obvious deformities, edema, or skin discoloration.  No atrophy or tone abnormalities are noted.   NEURO: Bilateral upper and lower extremity coordination and strength is symmetric.  No loss of sensation is noted.  MENTAL STATUS: A x O x 3, good concentration, speech is fluent and goal directed  MOTOR: 5/5 in bilateral lower extremity muscle groups  GAIT:  Antalgic.  Ambulates unassisted.    Imaging:  EXAMINATION: MRI lumbar spine without contrast     EXAMINATION DATE: 12/20/2023     COMPARISON: None     TECHNIQUE: Multiplanar multisequence images of the lumbar spine were obtained without the use contrast.     INDICATION: Pain in both arms     FINDINGS:     5 lumbar bodies. The lordotic curvature is normal. Grade 1 anterolisthesis of L4 and L5 without evidence for spondylolysis. Vertebral heights are maintained. Marrow signal pattern is normal. No evidence for compression informed, fracture, dislocation. Intervertebral disc space narrowing is identified at L4-L5 and L5-S1 with diffuse disc desiccation. Conus medullaris at the level of L1. The proximal spinal cord is normal caliber. The distal nerve roots are normal.     T12-L1: Small broad-based disc bulge with ligament flavum hypertrophy and degenerative changes facets. No evidence for central canal stenosis or neural foraminal narrowing.     L1-L2: Broad-based disc bulge with ligament flavum hypertrophy and degenerative changes facets. No evidence for central canal stenosis or neural foraminal narrowing.      L2-L3: Broad-based disc bulge with ligament flavum approach degenerative changes facets. Encroachment upon the left lateral recess is identified with impingement transcending L3 nerve root. No significant neural foraminal narrowing or central canal stenosis.     L3-L4: Broad-based disc bulge with ligament flavum hypertrophy and degenerative changes facets. Involvement of bilateral lateral recesses greater on the left right with impingement transcending L4 nerve roots. No evidence for central canal stenosis. Minimal neural foraminal narrowing on the right.     L4-L5: Exaggerated broad-based disc bulge with ligament flavum hypertrophy and degenerative changes facets causing minimal moderate central canal stenosis. Involvement of the bilateral lateral recesses with impingement of the transcending L5 nerve roots. Moderate bilateral neural foraminal narrowing.     L5-S1: Central disc protrusion with effacement of ventral CSF. No evidence for central canal stenosis. Involvement of bilateral lateral recesses with impingement of the transcending S1 nerve roots. Minimal neural foraminal narrowing.     Degenerative changes of the SI joints. The visualized hollow and solid viscera grossly normal         XR HIPS BILATERAL 2 VIEW INCL AP PELVIS     CLINICAL HISTORY:  Pain in right hip     FINDINGS:  Bilateral two views hips to include pelvis.     Right: No fracture dislocation bone destruction seen.     Left: No fracture dislocation bone destruction seen.        Electronically signed by: Oziel Aranda MD  Date:                                            12/15/2023    Labs:  BMP  Lab Results   Component Value Date     09/01/2016    K 3.8 09/01/2016     09/01/2016    CO2 26 09/01/2016    BUN 13 09/01/2016    CREATININE 0.83 09/01/2016    CALCIUM 9.6 09/01/2016     Lab Results   Component Value Date    ALT 13 09/01/2016    AST 19 09/01/2016    ALKPHOS 126 09/01/2016    BILITOT 0.4 09/01/2016     Lab Results    Component Value Date    WBC 6.8 09/01/2016    HGB 12.0 09/01/2016    HCT 38.5 09/01/2016    MCV 82.2 09/01/2016     09/01/2016         Assessment:  Problem List Items Addressed This Visit    None          04/02/2024 - Chinyere Najera is a 74 y.o. female who  has a past medical history of Arthritis, GERD (gastroesophageal reflux disease), HTN (hypertension), Lupus, and Vertigo.  By history and examination this patient has chronic right hip pain as well as low back pain with radiculopathy.  The underlying cause is hip osteoarthritis, degenerative disc disease and foraminal stenosis.  By history and exam of the bulk of her pain appears to be from the hip joint itself however I do feel like there is some contribution from her low back.  Lumbar MRI and hip x-rays were reviewed.  We discussed the underlying diagnoses and multiple treatment options including non-opioid medications, interventional procedures, physical therapy, home exercise, core muscle enhancement, and weight loss.  I will schedule the patient for a right intra-articular hip injection.  In the future she may also benefit from a lumbar epidural steroid injection as I feel this is also contributing to her pain.  The risks and benefits of each treatment option were discussed and all questions were answered.      05/03/[id number]-old female with a history of chronic right hip pain she also reported previously low back and radiculopathy symptoms.  Recently provided a right intra-articular hip injection on 04/17/2024 that provided her 99% relief her hip pain based on review of her recent images is related to hip osteoarthritis.  Her low back pain could be stemming from degenerative disc disease and foraminal stenosis.  For now we will hold off on any more injections considering her substantial relief with the right hip injection recommended she return to physical therapy as well as establish a home exercise plan.  Patient verbalized understanding and  agreed she did inquire about repeating a right intra-articular hip injection prior to a vacation in November recommend that she could call the office to reschedule.    11/13/2024-Chinyere Najera is a 74 y.o. female who  has a past medical history of Arthritis, GERD (gastroesophageal reflux disease), HTN (hypertension), Lupus, and Vertigo.  By history and examination this patient has chronic low back pain with RIGHT radiculopathy.  The underlying cause cause is facet arthritis, degenerative disc disease, foraminal stenosis, central canal stenosis, muscle dysfunction, and deconditioning.  Pathology is confirmed by imaging.  We discussed the underlying diagnoses and multiple treatment options including non-opioid medications, opioid medications, interventional procedures, physical therapy, home exercise, core muscle enhancement, activity modification, and weight loss.  The risks and benefits of each treatment option were discussed and all questions were answered.        12/17/2024-Chinyere Najera is a 74 y.o. female who  has a past medical history of Arthritis, GERD (gastroesophageal reflux disease), HTN (hypertension), Lupus, and Vertigo.  By history and examination this patient has chronic low back pain with radiculopathy.  The underlying cause cause is facet arthritis, degenerative disc disease, foraminal stenosis, central canal stenosis, and deconditioning.  Pathology is confirmed by imaging.  We discussed the underlying diagnoses and multiple treatment options including non-opioid medications, interventional procedures, physical therapy, home exercise, core muscle enhancement, activity modification, and weight loss.  The risks and benefits of each treatment option were discussed and all questions were answered.        Treatment Plan:   Procedures:  Scheduled for a right L4-5 and L5-S1 TF JUDIT  PT/OT/HEP: I have stressed the importance of physical activity and a home exercise plan to help with pain and improve health.   Continue with home exercise regimen and physical therapy  Medications:  Continue medications as previously prescribed   -  Reviewed and consistent with medication use as prescribed.  Imaging:  Previous imaging reviewed and discussed with the patient in detail using spine model and images from her chart.  Follow Up:  2- 3 weeks after injection    STEVE ZamarripaC  [facility]    Disclaimer: This note was partly generated using dictation software which may occasionally result in transcription errors.

## 2024-12-27 ENCOUNTER — TELEPHONE (OUTPATIENT)
Dept: PAIN MEDICINE | Facility: CLINIC | Age: 74
End: 2024-12-27
Payer: MEDICARE

## 2025-01-08 ENCOUNTER — TELEPHONE (OUTPATIENT)
Dept: PAIN MEDICINE | Facility: CLINIC | Age: 75
End: 2025-01-08
Payer: MEDICARE

## 2025-01-13 NOTE — PRE-PROCEDURE INSTRUCTIONS
Patient reviewed on 1/13/2024.  Okay to proceed at [facility]. The following pre-procedure instructions and arrival time have been reviewed with patient via phone and sent to patient portal for review.  Patient verbalized an understanding.  Pt to be accompanied by Miley Olson day of procedure as responsible .      Dear Chinyere,     Please read over the following pre-procedure instructions in it's entirety as there is helpful information here to get you well prepared for your upcoming procedure.     You are scheduled for a procedure with Dr. Li on 1/15/2024.     [facility] at the corner of [facility] and [facility]. It is in the [facility] next to [employer]. The address is: [address] [place] [address]. Take the elevator to the 2nd floor.       Registration check in time: 8:00 am  Scheduled procedure time: 9:00 am     If you are receiving sedation, you CANNOT drive yourself and must have a responsible friend or family member (no rideshare) to drive you home.        You should take any medications that you routinely take for blood pressure, heart medications, thyroid, cholesterol, etc.      The fasting restrictions are dependent on whether or not you are receiving sedation. Sedation is not available for all procedures.      Your fasting instructions/Sedation type are as follow:  IV sedation.   Nothing to eat after midnight the night prior to procedure.   Patients are encouraged to consume clear liquids up to 2 hours prior to scheduled arrival time.  -Clear liquids include Gatorade, water, soda, black coffee or tea (no milk or creamer), and clear juices. - Clear liquids do NOT include anything with pulp or food particles (chicken broth, ice cream, yogurt, Jello, etc.) You CANNOT drive yourself and must have a .              If you are on blood thinners, you need to follow the anticoagulation instructions that had been discussed previously. You should only stop the blood  thinners if it was approved by your primary care physician or your cardiologist. In the event that you are not able to stop your blood thinners, a blood thinner was not listed on your medication list, or we were not able to get clearance from your cardiologist, then the procedure may have to be postponed/canceled.      IF you were told to stop your blood thinners, this is how long you should generally hold some of the more common ones. Remember that stopping blood thinners is only necessary for certain procedures. If you are unsure of your instructions, please call us.   Aspirin - 5 days  Plavix/Clopidogrel - 7 days  Warfarin / Coumadin - 5 days  Eliquis - 3 days  Pradaxa/Dabigatran - 4 days  Xarelto/Rivaroxaban - 3 days        HOLD all non-insulin injections (shots) until after surgery (Ozempic, Mounjaro, Trulicity, Victoza, Byetta, Wegovy and Adlyxin) (Total of 7 days prior)        If you are a diabetic, do not take your medication if you will be fasting, but bring it with you. Please plan on being here for roughly 2-3 hours.     Please call us if you have been sick (running fever, having any flu-like symptoms) or have been taking ANTIBIOTICS in the past 2 weeks or had any outpatient procedures other than with us (colonoscopy, endoscopy, OBGYN, dental, etc.).      If you have been previously COVID positive, you will need to hold off on your procedure until you are symptom free for 10 days. If you did not have any symptoms, you can have your procedure 10 days from your positive test result.         On the morning of your procedure:  *HOLD ALL VITAMINS, MINERALS, HERBS (INCLUDING HERBAL TEAS) AND SUPPLEMENTS  *SHOWER WITH ANTIBACTERIAL SOAP (EX. DIAL) NIGHT BEFORE AND MORNING OF PROCEDURE  *DO NOT APPLY ANY LOTIONS, OILS, POWDERS, PERFUME/COLOGNE, OINTMENTS, GELS, CREAMS, MAKEUP OR DEODORANT TO YOUR SKIN MORNING OF PROCEDURE  *LEAVE JEWELRY AND ANY VALUABLES AT HOME  *WEAR LOOSE COMFORTABLE CLOTHING         Please  reply to this portal message as receipt of delivery.     Thank you,  [facility]  [place], LPN [facility]  Pre-Admit

## 2025-01-15 ENCOUNTER — HOSPITAL ENCOUNTER (OUTPATIENT)
Facility: HOSPITAL | Age: 75
Discharge: HOME | End: 2025-01-15
Attending: STUDENT IN AN ORGANIZED HEALTH CARE EDUCATION/TRAINING PROGRAM | Admitting: STUDENT IN AN ORGANIZED HEALTH CARE EDUCATION/TRAINING PROGRAM
Payer: MEDICARE

## 2025-01-15 VITALS
OXYGEN SATURATION: 100 % | RESPIRATION RATE: 18 BRPM | HEART RATE: 75 BPM | WEIGHT: 220 LBS | BODY MASS INDEX: 37.56 KG/M2 | HEIGHT: 64 IN | SYSTOLIC BLOOD PRESSURE: 121 MMHG | DIASTOLIC BLOOD PRESSURE: 58 MMHG | TEMPERATURE: 98 F

## 2025-01-15 DIAGNOSIS — M54.16 LUMBAR RADICULOPATHY: Primary | ICD-10-CM

## 2025-01-15 DIAGNOSIS — G89.29 CHRONIC PAIN: ICD-10-CM

## 2025-01-15 PROCEDURE — 25500020 PHARM REV CODE 255: Performed by: STUDENT IN AN ORGANIZED HEALTH CARE EDUCATION/TRAINING PROGRAM

## 2025-01-15 PROCEDURE — 64483 NJX AA&/STRD TFRM EPI L/S 1: CPT | Mod: RT,,, | Performed by: STUDENT IN AN ORGANIZED HEALTH CARE EDUCATION/TRAINING PROGRAM

## 2025-01-15 PROCEDURE — 99152 MOD SED SAME PHYS/QHP 5/>YRS: CPT | Performed by: STUDENT IN AN ORGANIZED HEALTH CARE EDUCATION/TRAINING PROGRAM

## 2025-01-15 PROCEDURE — 64484 NJX AA&/STRD TFRM EPI L/S EA: CPT | Mod: RT,,, | Performed by: STUDENT IN AN ORGANIZED HEALTH CARE EDUCATION/TRAINING PROGRAM

## 2025-01-15 PROCEDURE — 64483 NJX AA&/STRD TFRM EPI L/S 1: CPT | Mod: RT | Performed by: STUDENT IN AN ORGANIZED HEALTH CARE EDUCATION/TRAINING PROGRAM

## 2025-01-15 PROCEDURE — 64484 NJX AA&/STRD TFRM EPI L/S EA: CPT | Mod: RT | Performed by: STUDENT IN AN ORGANIZED HEALTH CARE EDUCATION/TRAINING PROGRAM

## 2025-01-15 PROCEDURE — 63600175 PHARM REV CODE 636 W HCPCS: Performed by: STUDENT IN AN ORGANIZED HEALTH CARE EDUCATION/TRAINING PROGRAM

## 2025-01-15 RX ORDER — LIDOCAINE HYDROCHLORIDE 20 MG/ML
INJECTION, SOLUTION EPIDURAL; INFILTRATION; INTRACAUDAL; PERINEURAL
Status: DISCONTINUED | OUTPATIENT
Start: 2025-01-15 | End: 2025-01-15 | Stop reason: HOSPADM

## 2025-01-15 RX ORDER — FENTANYL CITRATE 50 UG/ML
INJECTION, SOLUTION INTRAMUSCULAR; INTRAVENOUS
Status: DISCONTINUED | OUTPATIENT
Start: 2025-01-15 | End: 2025-01-15 | Stop reason: HOSPADM

## 2025-01-15 RX ORDER — DEXAMETHASONE SODIUM PHOSPHATE 10 MG/ML
INJECTION INTRAMUSCULAR; INTRAVENOUS
Status: DISCONTINUED | OUTPATIENT
Start: 2025-01-15 | End: 2025-01-15 | Stop reason: HOSPADM

## 2025-01-15 RX ORDER — SODIUM CHLORIDE 9 MG/ML
INJECTION, SOLUTION INTRAVENOUS CONTINUOUS
Status: DISCONTINUED | OUTPATIENT
Start: 2025-01-15 | End: 2025-01-15 | Stop reason: HOSPADM

## 2025-01-15 RX ORDER — MIDAZOLAM HYDROCHLORIDE 1 MG/ML
INJECTION INTRAMUSCULAR; INTRAVENOUS
Status: DISCONTINUED | OUTPATIENT
Start: 2025-01-15 | End: 2025-01-15 | Stop reason: HOSPADM

## 2025-01-15 NOTE — PLAN OF CARE
pre-procedure, checklist and tasks completed, VSS, pt has no c/o voiced, no needs requested, time allotted for questions/ concerns, bed low and locked, siderails up, call light given to pt

## 2025-01-15 NOTE — DISCHARGE SUMMARY
Discharge Note  Short Stay      SUMMARY     Admit Date: 1/15/2025    Attending Physician: Laura Li      Discharge Physician: Laura Li      Discharge Date: 1/15/2025 8:49 AM    Procedure(s) (LRB):  TFESI RT L4/5, L5-S1 (Right)    Final Diagnosis: Lumbar radiculopathy, acute [M54.16]  Chronic pain syndrome [G89.4]  Degeneration of intervertebral disc of lumbar region with discogenic back pain and lower extremity pain [M51.362]    Disposition: Home or self care    Patient Instructions:   Current Discharge Medication List        CONTINUE these medications which have NOT CHANGED    Details   alendronate (FOSAMAX) 70 MG tablet Take 70 mg by mouth every 7 days.      amLODIPine (NORVASC) 5 MG tablet Take 1 tablet by mouth once daily.      ergocalciferol (ERGOCALCIFEROL) 50,000 unit Cap Take 50,000 Units by mouth.      famotidine (PEPCID) 20 MG tablet Take 20 mg by mouth.      levocetirizine (XYZAL) 5 MG tablet Take 10 mg by mouth.      mycophenolate (CELLCEPT) 500 mg Tab Take by mouth.      olmesartan-hydrochlorothiazide (BENICAR HCT) 40-25 mg per tablet Take 1 tablet by mouth.      albuterol (PROVENTIL/VENTOLIN HFA) 90 mcg/actuation inhaler Inhale 2 puffs into the lungs.      ascorbic acid, vitamin C, (VITAMIN C) 1000 MG tablet Take 1,000 mg by mouth.      aspirin (ECOTRIN) 81 MG EC tablet Take 81 mg by mouth once daily.      azelastine (ASTELIN) 137 mcg (0.1 %) nasal spray 1 spray by Nasal route.      azilsartan med-chlorthalidone (EDARBYCLOR) 40-12.5 mg Tab Take by mouth once daily.      calcium-vitamin D3 (CALCIUM 500 + D) 500 mg(1,250mg) -200 unit per tablet Take by mouth once daily.      diclofenac (VOLTAREN) 75 MG EC tablet Take 75 mg by mouth 2 (two) times daily.      esomeprazole (NEXIUM) 40 MG capsule Take 40 mg by mouth before breakfast.      fexofenadine (ALLEGRA) 180 MG tablet Take 180 mg by mouth once daily.      ibuprofen (ADVIL,MOTRIN) 800 MG tablet Take 800 mg by mouth every 6 (six) hours  as needed for Pain.      methylPREDNISolone (MEDROL DOSEPACK) 4 mg tablet use as directed  Qty: 1 each, Refills: 1    Associated Diagnoses: Primary osteoarthritis of right hip      potassium chloride (MICRO-K) 10 MEQ CpSR Take 10 mEq by mouth once daily.      pregabalin (LYRICA) 75 MG capsule Take 75 mg by mouth 2 (two) times daily.      rosuvastatin (CRESTOR) 10 MG tablet Take 10 mg by mouth.                 Discharge Diagnosis: Lumbar radiculopathy, acute [M54.16]  Chronic pain syndrome [G89.4]  Degeneration of intervertebral disc of lumbar region with discogenic back pain and lower extremity pain [M51.362]  Condition on Discharge: Stable with no complications to procedure   Diet on Discharge: Same as before.  Activity: as per instruction sheet.  Discharge to: Home with a responsible adult.  Follow up: 2-4 weeks       Please call my office or pager at [phone] if experienced any weakness or loss of sensation, fever > 101.5, pain uncontrolled with oral medications, persistent nausea/vomiting/or diarrhea, redness or drainage from the incisions, or any other worrisome concerns. If physician on call was not reached or could not communicate with our office for any reason please go to the nearest emergency department

## 2025-01-15 NOTE — DISCHARGE INSTRUCTIONS
[facility] [place]  Dr. Laura Li  Messaging service # [phone]    POST-PROCEDURE INSTRUCTIONS:    Today you had an injection that included a steroid medications.  The steroid may or may not have been mixed with a local anesthetic when it was injected.   If the injection was in the neck, you may feel some pressure, numbness, or slight weakness in the arm after the procedure for a short period of time (this is a normal response), if this persists for longer than 1 day please contact our office or go to the emergency room.  If the injection was in the low back, you may feel some pressure, numbness, or slight weakness in the leg after the procedure for a short period of time (this is a normal response), if this persists for longer than 1 day please contact our office or go to the emergency room.  You may get side effects from the steroid.  This is not uncommon.  Symptoms include: elevated blood sugar, elevated blood pressure, headache, flushing, nausea, insomnia.  These symptoms are transient and will resolve within 1-3 days.  If symptoms last longer than this please contact our office or head to the emergency room.  Steroid medications can take anywhere from 3-14 days to take effect (rarely longer).  You may notice that your pain worsens for a short period of time after the injection, this would not be unusual due to the pressure and trauma from the needle.    If you do not have a follow up appointment scheduled, please contact my office (or the office of the physician who referred you for the procedure) to get a post-procedure follow up scheduled 2-4 weeks after the procedure.  This can be done as a virtual visit if that is more convenient for you.      What you need to do:    Keep a record of your response to the injection you had today.    How much relief did you get?   When did the relief start and how long did it last?  Were you able to decrease the use of any of your pain  medications?  Were you able to increase your level of activity?  How long did the relief last?    What to watch out for:    If you experience any of the following symptoms after your procedure, please notify the messaging service immediately (see above for contact information):   fever (increased oral temperature)   bleeding or swelling at the injection site,    drainage, rash or redness at the injection site    possible signs of infection    increased pain at the injection site   worsening of your usual pain   severe headache   new or worsening numbness    new arm and/or leg weakness, or    changes in bowel and/or bladder function: urinating or defecating on yourself and not knowing that you did it.    PLEASE FOLLOW ALL INSTRUCTIONS CAREFULLY     Do not engage in strenuous activity (e.g., lifting or pushing heavy objects or repeated bending) for 24 hours.     Do not take a bath, swim or use Jacuzzi for 24 hours after procedure. (A shower is fine).   Remove any Band-Aids when you get home.    Use cold/ice, as needed for comfort.  We recommend the use of cold therapy alternating on for 20 minutes, off for 20 minutes.    Do not apply direct heat (heating pad or heat packs) to the injection site for 24 hours.     Resume your usual medications, unless instructed otherwise by your Pain Physician.     If you are on warfarin (Coumadin) or other blood thinner, resume this medication as instructed by your prescribing Physician.    IF AT ANY POINT YOU ARE VERY CONCERNED ABOUT YOUR SYMPTOMS, PLEASE GO TO THE EMERGENCY ROOM.    If you develop worsening pain, weakness, numbness, lose bowel or bladder control (i.e., having an accident where you did not even know you had to go to the bathroom and suddenly noticed you soiled yourself), saddle anesthesia (a loss of sensation restricted to the area of the buttocks, anus and between the legs -- i.e., those parts of your body that would touch a saddle if you were sitting on one) you  need to go immediately to the emergency department for evaluation and treatment.    ----------------------------------------------------------------------------------------------------------------------------------------------------------------  If you received Sedation please read the following instructions:  POST SEDATION INSTRUCTIONS    Today you received intravenous medication (also known as sedation) that was used to help you relax and/or decrease discomfort during your procedure. This medication will be acting in your body for the next 24 hours, so you might feel a little tired or sleepy. This feeling will slowly wear off.   Common side effects associated with these medications include: drowsiness, dizziness, sleepiness, confusion, feeling excited, difficulty remembering things, lack of steadiness with walking or balance, loss of fine muscle control, slowed reflexes, difficulty focusing, and blurred vision.  Some over-the-counter and prescription medications (e.g., muscle relaxants, opioids, mood-altering medications, sedatives/hypnotics, antihistamines) can interact with the intravenous medication you received and cause an increased risk of the side effects listed above in addition to other potentially life threatening side effects. Use extreme caution if you are taking such medications, and consult with your Pain Physician or prescribing physician if you have any questions.  For the next 12-24 hours:    DO NOT--Drive a car, operate machinery or power tools   DO NOT--Drink any alcoholic beverages (not even beer), they may dangerously increase the risk of side effects.    DO NOT--Make any important legal or business decisions or sign important documents.  We advise you to have someone to assist you at home. Move slowly and carefully. Do not make sudden changes in position. Be aware of dizziness or light-headedness and move accordingly.   If you seek medical treatment within 24 hours, let the nurse or doctor  caring for you know that you have received the above medications. If you have any questions or concerns related to your sedation or treatment today please contact us.

## 2025-01-15 NOTE — OP NOTE
Lumbar Transforaminal Epidural Steroid Injection under Fluoroscopic Guidance    The procedure, risks, benefits, and options were discussed with the patient. There are no contraindications to the procedure. The patent expressed understanding and agreed to the procedure. Informed written consent was obtained prior to the start of the procedure and can be found in the patient's chart.    PATIENT NAME: Chinyere Najera   MRN: [id number]     DATE OF PROCEDURE: 01/15/2025    PROCEDURE:  Right  L4/5 and L5/S1 Lumbar Transforaminal Epidural Steroid Injection under Fluoroscopic Guidance    PRE-OP DIAGNOSIS: Lumbar radiculopathy, acute [M54.16]  Chronic pain syndrome [G89.4]  Degeneration of intervertebral disc of lumbar region with discogenic back pain and lower extremity pain [M51.362] Lumbar radiculopathy [M54.16]    POST-OP DIAGNOSIS: Same    PHYSICIAN: Laura Li DO    ASSISTANTS: None     MEDICATIONS INJECTED: Preservative-free Decadron 10mg with 5cc of Lidocaine 1% MPF     LOCAL ANESTHETIC INJECTED: Xylocaine 2%     SEDATION: Versed 2mg and Fentanyl 50mcg                                                                                                                                                                                     Conscious sedation ordered by M.D. Patient re-evaluation prior to administration of conscious sedation. No changes noted in patient's status from initial evaluation. The patient's vital signs were monitored by RN and patient remained hemodynamically stable throughout the procedure.    Event Time In   Sedation Start 0838       ESTIMATED BLOOD LOSS: None    COMPLICATIONS: None    TECHNIQUE: Time-out was performed to identify the patient and procedure to be performed. With the patient laying in a prone position, the surgical area was prepped and draped in the usual sterile fashion using ChloraPrep and a fenestrated drape.The levels were determined under fluoroscopy guidance. Skin anesthesia  was achieved by injecting Lidocaine 2% over the injection sites. The transforaminal spaces were then approached with a 22 gauge, 5 inch spinal quinke needle that was introduced under fluoroscopic guidance in the AP and Lateral views. Once the needle tip was in the area of the transforaminal space, and there was no blood, CSF or paraesthesias, contrast dye Omnipaque (300mg/mL) was injected to confirm placement and there was no vascular runoff. Fluoroscopic imaging in the AP and lateral views revealed a clear outline of the spinal nerve with proximal spread of agent through the neural foramen into the epidural space. 3 mL of the medication mixture listed above was injected slowly at each site. Displacement of the radio opaque contrast after injection of the medication confirmed that the medication went into the area of the transforaminal spaces. The needles were removed and bleeding was nil. A sterile dressing was applied. No specimens collected. The patient tolerated the procedure well.       The patient was monitored after the procedure in the recovery area. They were given post-procedure and discharge instructions to follow at home. The patient was discharged in a stable condition.      Laura Li DO

## 2025-01-29 ENCOUNTER — OFFICE VISIT (OUTPATIENT)
Dept: PAIN MEDICINE | Facility: CLINIC | Age: 75
End: 2025-01-29
Payer: MEDICARE

## 2025-01-29 VITALS
DIASTOLIC BLOOD PRESSURE: 74 MMHG | WEIGHT: 224.31 LBS | HEART RATE: 76 BPM | SYSTOLIC BLOOD PRESSURE: 131 MMHG | BODY MASS INDEX: 38.3 KG/M2 | HEIGHT: 64 IN

## 2025-01-29 DIAGNOSIS — M54.16 LUMBAR RADICULOPATHY: Primary | ICD-10-CM

## 2025-01-29 DIAGNOSIS — M25.551 RIGHT HIP PAIN: ICD-10-CM

## 2025-01-29 DIAGNOSIS — G89.4 CHRONIC PAIN SYNDROME: ICD-10-CM

## 2025-01-29 DIAGNOSIS — M51.362 DEGENERATION OF INTERVERTEBRAL DISC OF LUMBAR REGION WITH DISCOGENIC BACK PAIN AND LOWER EXTREMITY PAIN: ICD-10-CM

## 2025-01-29 DIAGNOSIS — M16.11 PRIMARY OSTEOARTHRITIS OF RIGHT HIP: ICD-10-CM

## 2025-01-29 PROCEDURE — 1125F AMNT PAIN NOTED PAIN PRSNT: CPT | Mod: CPTII,S$GLB,, | Performed by: NURSE PRACTITIONER

## 2025-01-29 PROCEDURE — 3075F SYST BP GE 130 - 139MM HG: CPT | Mod: CPTII,S$GLB,, | Performed by: NURSE PRACTITIONER

## 2025-01-29 PROCEDURE — 99999 PR PBB SHADOW E&M-EST. PATIENT-LVL IV: CPT | Mod: PBBFAC,,, | Performed by: NURSE PRACTITIONER

## 2025-01-29 PROCEDURE — 3008F BODY MASS INDEX DOCD: CPT | Mod: CPTII,S$GLB,, | Performed by: NURSE PRACTITIONER

## 2025-01-29 PROCEDURE — 1101F PT FALLS ASSESS-DOCD LE1/YR: CPT | Mod: CPTII,S$GLB,, | Performed by: NURSE PRACTITIONER

## 2025-01-29 PROCEDURE — 99214 OFFICE O/P EST MOD 30 MIN: CPT | Mod: S$GLB,,, | Performed by: NURSE PRACTITIONER

## 2025-01-29 PROCEDURE — 1160F RVW MEDS BY RX/DR IN RCRD: CPT | Mod: CPTII,S$GLB,, | Performed by: NURSE PRACTITIONER

## 2025-01-29 PROCEDURE — 1159F MED LIST DOCD IN RCRD: CPT | Mod: CPTII,S$GLB,, | Performed by: NURSE PRACTITIONER

## 2025-01-29 PROCEDURE — 3288F FALL RISK ASSESSMENT DOCD: CPT | Mod: CPTII,S$GLB,, | Performed by: NURSE PRACTITIONER

## 2025-01-29 PROCEDURE — G2211 COMPLEX E/M VISIT ADD ON: HCPCS | Mod: S$GLB,,, | Performed by: NURSE PRACTITIONER

## 2025-01-29 PROCEDURE — 3078F DIAST BP <80 MM HG: CPT | Mod: CPTII,S$GLB,, | Performed by: NURSE PRACTITIONER

## 2025-01-29 RX ORDER — GABAPENTIN 300 MG/1
300 CAPSULE ORAL NIGHTLY
Qty: 30 CAPSULE | Refills: 0 | Status: SHIPPED | OUTPATIENT
Start: 2025-01-29 | End: 2025-02-28

## 2025-01-29 NOTE — PROGRESS NOTES
[facility] Patient Evaluation    Referred by: Aaareferral Self   Reason for referral: Lumbar radiculopathy     CC:   Chief Complaint   Patient presents with    Hip Pain     Right     Low-back Pain    Leg Pain     Right          1/29/2025    10:08 AM 12/17/2024     8:49 AM 11/13/2024     8:42 AM   Last 3 PDI Scores   Pain Disability Index (PDI) 19 60 60     Interval history 01/29/2025:  74-year-old female that presents for a follow-up appointment she is status post a right L4-5 and L5-S1 TF JUDIT on 01/15/2025 patient reports overall 30% relief of her symptoms she reports 100% relief of her right leg numbness, however she is still having pain in the anterior leg as well as right groin pain.  Patient denies any new pain denies any profound weakness denies any bowel or bladder dysfunction at this time her pain score today is 8/10.  She does still report increased pain with performing ADLs her groin pain is stemming from osteoarthritis of the right hip her low back and right leg pain is related to DDD and spinal stenosis.       Interval Update 12/17/2024: Patient return to clinic to discuss MRI results.  Patient reports continued pain in the low back with radiating symptoms into her right buttocks down her right lateral and posterior leg into her right foot.  Patient reports right leg pain accompanies low back pain consistently.  Patient denies any profound weakness denies any recent incident or trauma patient does report paresthesia like symptoms in the right leg she describes this as tingling in her right foot and right lower extremity).  Patient is established office and has a previously provided a right intra-articular hip injection with mild-to-moderate relief.    Interval Update 11/13/2024: Patient return to clinic SP right intra-articular hip injection procedure done on 10/30/2024 with 95% relief for 5 days and then the patient felt like the pain returned.  She is  reporting new pain in the right buttocks radiating down her right leg and sometimes affecting her right foot.  She is reporting paresthesia like symptoms in the right leg she also feels like the pain in her right buttocks as sharp which does not allow her to sit on her right side she has to lean to her left.  She denies any recent incident or trauma denies any profound weakness.  She reports having experiences in the past but the intensity of it exacerbated following her right intra-articular hip injection on 10/30/2024.    Interval history 05/03/2024:  72 year female presents today for a follow-up appointment she has a history of chronic right hip pain she is status post a right intra-articular hip injection with Dr. Li on 04/17/2024  that provided her 99% relief of her symptoms and improvement in her functionality.  She denies any new pain denies profound weakness she acknowledges that she will return to a home exercise plan that includes aqua therapy.  She will also restart her physical therapy.      Subjective 04/02/24  Chinyere Najera is a 74 y.o. female who presents complaining of right hip pain.  The pain radiates to the groin into the right knee.  Pain is worse with standing, walking and daily activities.  She was previously undergone intra-articular right hip injections with excellent relief ranging anywhere from 8 months to 18 months.  She would like to repeat a hip injection.     Location: right hip pain   Onset: years, worse over last 6 months  Current Pain Score: 9/10  Daily Pain of Range: 9-10/10  Quality: Aching, Throbbing, Deep, and Sharp  Radiation:  Right groin and right knee  Worsened by: lying down, sitting, standing for more than a few minutes, walking for more than a few minutes, and daily activity.  Improved by: nothing    Patient denies night fever/night sweats, urinary incontinence, bowel incontinence, significant weight loss, significant motor weakness, and loss of sensations.    Previous  Interventions:  -01/15/2025 Right  L4/5 and L5/S1 Lumbar TF JUDIT 30% right leg numbness 100% relieved  -10/30/2024 : Right Hip Joint Injection 95% relief for 5 days  -04/17/2024-right intra-articular hip injection 99% relief  - several right intra-articular hip injections with Orthopedics with relief ranging from 8-18 months    Previous Therapies:  PT/OT: yes   Chiropractor:   HEP:  Yes - physical therapy twice weekly and aquatic exercise up to 4 times weekly  Relevant Surgery: no   Previous Medications:   - NSAIDS:  Diclofenac  - Muscle Relaxants:    - TCAs:   - SNRIs:   - Topicals:   - Anticonvulsants:  Lyrica   - Opioids:   - Adjuvants:  Medrol Dosepak    Current Pain Medications:   Robaxin  Voltaren  Ibuprofen    Review of Systems:  Review of Systems   Musculoskeletal:  Positive for joint pain.       GENERAL:  No weight loss, malaise or fevers.  HEENT:   No recent changes in vision or hearing  NECK:  No difficulty with swallowing. No stridor.   RESPIRATORY:  Negative for cough, wheezing or shortness of breath, patient denies any recent URI.  CARDIOVASCULAR:  Negative for chest pain, leg swelling or palpitations.  GI:  Negative for abdominal discomfort, blood in stools or black stools or change in bowel habits.  MUSCULOSKELETAL:  See HPI.  SKIN:  Negative for lesions, rash, and itching.  PSYCH:  No mood disorder or recent psychosocial stressors.    HEMATOLOGY/LYMPHOLOGY:  Negative for prolonged bleeding, bruising easily or swollen nodes.  Patient is not currently taking any anti-coagulants  NEURO:   No history of headaches, syncope, paralysis, seizures or tremors.  All other reviewed and negative other than HPI.    History:  Current medications, allergies, medical history, surgical history,   family history, and social history were reviewed in the chart as marked.    Full Medication List:    Current Outpatient Medications:     alendronate (FOSAMAX) 70 MG tablet, Take 70 mg by mouth every 7 days., Disp: , Rfl:      aspirin (ECOTRIN) 81 MG EC tablet, Take 81 mg by mouth once daily., Disp: , Rfl:     ergocalciferol (ERGOCALCIFEROL) 50,000 unit Cap, Take 50,000 Units by mouth., Disp: , Rfl:     famotidine (PEPCID) 20 MG tablet, Take 20 mg by mouth., Disp: , Rfl:     ibuprofen (ADVIL,MOTRIN) 800 MG tablet, Take 800 mg by mouth every 6 (six) hours as needed for Pain., Disp: , Rfl:     levocetirizine (XYZAL) 5 MG tablet, Take 10 mg by mouth., Disp: , Rfl:     mycophenolate (CELLCEPT) 500 mg Tab, Take by mouth., Disp: , Rfl:     olmesartan-hydrochlorothiazide (BENICAR HCT) 40-25 mg per tablet, Take 1 tablet by mouth., Disp: , Rfl:     potassium chloride (MICRO-K) 10 MEQ CpSR, Take 10 mEq by mouth once daily., Disp: , Rfl:     rosuvastatin (CRESTOR) 10 MG tablet, Take 10 mg by mouth., Disp: , Rfl:     albuterol (PROVENTIL/VENTOLIN HFA) 90 mcg/actuation inhaler, Inhale 2 puffs into the lungs., Disp: , Rfl:     amLODIPine (NORVASC) 5 MG tablet, Take 1 tablet by mouth once daily., Disp: , Rfl:     ascorbic acid, vitamin C, (VITAMIN C) 1000 MG tablet, Take 1,000 mg by mouth., Disp: , Rfl:     azelastine (ASTELIN) 137 mcg (0.1 %) nasal spray, 1 spray by Nasal route., Disp: , Rfl:     azilsartan med-chlorthalidone (EDARBYCLOR) 40-12.5 mg Tab, Take by mouth once daily., Disp: , Rfl:     calcium-vitamin D3 (CALCIUM 500 + D) 500 mg(1,250mg) -200 unit per tablet, Take by mouth once daily. (Patient not taking: Reported on 1/29/2025), Disp: , Rfl:     diclofenac (VOLTAREN) 75 MG EC tablet, Take 75 mg by mouth 2 (two) times daily., Disp: , Rfl:     esomeprazole (NEXIUM) 40 MG capsule, Take 40 mg by mouth before breakfast. (Patient not taking: Reported on 1/29/2025), Disp: , Rfl:     fexofenadine (ALLEGRA) 180 MG tablet, Take 180 mg by mouth once daily., Disp: , Rfl:     gabapentin (NEURONTIN) 300 MG capsule, Take 1 capsule (300 mg total) by mouth every evening., Disp: 30 capsule, Rfl: 0    methylPREDNISolone (MEDROL DOSEPACK) 4 mg tablet, use as  "directed, Disp: 1 each, Rfl: 1     Allergies:  Patient has no known allergies.     Medical History:   has a past medical history of Arthritis, GERD (gastroesophageal reflux disease), HTN (hypertension), Lupus, and Vertigo.    Surgical History:   has a past surgical history that includes Partial hysterectomy; Cholecystectomy; Injection of joint (Right, 4/17/2024); Injection of joint (Right, 10/30/2024); and Transforaminal epidural injection of steroid (Right, 1/15/2025).    Family History:  family history is not on file.    Social History:   reports that she has never smoked. She does not have any smokeless tobacco history on file. She reports current alcohol use. She reports that she does not use drugs.    Physical Exam:  Vitals:    01/29/25 1007   BP: 131/74   Pulse: 76   Weight: 101.8 kg (224 lb 5.1 oz)   Height: 5' 4" (1.626 m)   PainSc:   8   PainLoc: Hip           GENERAL: Well appearing, in no acute distress, alert and oriented x3.  PSYCH:  Mood and affect appropriate.  SKIN: Skin color, texture, turgor normal, no rashes or lesions.  HEAD/FACE:  Normocephalic, atraumatic. Cranial nerves grossly intact.  NECK: Normal ROM. Supple.   CV: RRR with palpation of the radial artery.  PULM: No evidence of respiratory difficulty, symmetric chest rise.  GI:  Soft and non-distended.  MSK: Straight leg raising is Positive on the Right to radicular pain. + pain to palpation over the facet joints of the lumbar spine. + pain with lumbar facet loading.  LAMAR test is positive for right hip pain. Pain with ROM of the Right Hip. Mild pain over the GBT bilaterally. No obvious deformities, edema, or skin discoloration.  No atrophy or tone abnormalities are noted.   NEURO: Bilateral upper and lower extremity coordination and strength is symmetric.  No loss of sensation is noted.  MENTAL STATUS: A x O x 3, good concentration, speech is fluent and goal directed  MOTOR: 5/5 in bilateral lower extremity muscle groups  GAIT:  Antalgic.  " Ambulates unassisted.    Imaging:  EXAMINATION: MRI lumbar spine without contrast     EXAMINATION DATE: 12/20/2023     COMPARISON: None     TECHNIQUE: Multiplanar multisequence images of the lumbar spine were obtained without the use contrast.     INDICATION: Pain in both arms     FINDINGS:     5 lumbar bodies. The lordotic curvature is normal. Grade 1 anterolisthesis of L4 and L5 without evidence for spondylolysis. Vertebral heights are maintained. Marrow signal pattern is normal. No evidence for compression informed, fracture, dislocation. Intervertebral disc space narrowing is identified at L4-L5 and L5-S1 with diffuse disc desiccation. Conus medullaris at the level of L1. The proximal spinal cord is normal caliber. The distal nerve roots are normal.     T12-L1: Small broad-based disc bulge with ligament flavum hypertrophy and degenerative changes facets. No evidence for central canal stenosis or neural foraminal narrowing.     L1-L2: Broad-based disc bulge with ligament flavum hypertrophy and degenerative changes facets. No evidence for central canal stenosis or neural foraminal narrowing.     L2-L3: Broad-based disc bulge with ligament flavum approach degenerative changes facets. Encroachment upon the left lateral recess is identified with impingement transcending L3 nerve root. No significant neural foraminal narrowing or central canal stenosis.     L3-L4: Broad-based disc bulge with ligament flavum hypertrophy and degenerative changes facets. Involvement of bilateral lateral recesses greater on the left right with impingement transcending L4 nerve roots. No evidence for central canal stenosis. Minimal neural foraminal narrowing on the right.     L4-L5: Exaggerated broad-based disc bulge with ligament flavum hypertrophy and degenerative changes facets causing minimal moderate central canal stenosis. Involvement of the bilateral lateral recesses with impingement of the transcending L5 nerve roots. Moderate  bilateral neural foraminal narrowing.     L5-S1: Central disc protrusion with effacement of ventral CSF. No evidence for central canal stenosis. Involvement of bilateral lateral recesses with impingement of the transcending S1 nerve roots. Minimal neural foraminal narrowing.     Degenerative changes of the SI joints. The visualized hollow and solid viscera grossly normal         XR HIPS BILATERAL 2 VIEW INCL AP PELVIS     CLINICAL HISTORY:  Pain in right hip     FINDINGS:  Bilateral two views hips to include pelvis.     Right: No fracture dislocation bone destruction seen.     Left: No fracture dislocation bone destruction seen.        Electronically signed by: Oziel Aranda MD  Date:                                            12/15/2023    Labs:  BMP  Lab Results   Component Value Date     01/20/2025    K 3.5 01/20/2025     09/01/2016    CO2 28 01/20/2025    BUN 13 01/20/2025    CREATININE 0.63 01/20/2025    CALCIUM 9.6 01/20/2025    EGFRNORACEVR 94 01/20/2025     Lab Results   Component Value Date    ALT 13 01/20/2025    AST 18 01/20/2025    ALKPHOS 86 01/20/2025    BILITOT 0.4 01/20/2025     Lab Results   Component Value Date    WBC 6.8 09/01/2016    HGB 12.0 09/01/2016    HCT 38.5 09/01/2016    MCV 82.2 09/01/2016     09/01/2016         Assessment:  Problem List Items Addressed This Visit    None  Visit Diagnoses       Lumbar radiculopathy    -  Primary    Relevant Medications    gabapentin (NEURONTIN) 300 MG capsule    Chronic pain syndrome        Relevant Medications    gabapentin (NEURONTIN) 300 MG capsule    Degeneration of intervertebral disc of lumbar region with discogenic back pain and lower extremity pain        Relevant Medications    gabapentin (NEURONTIN) 300 MG capsule                04/02/2024 - Chinyere Najera is a 74 y.o. female who  has a past medical history of Arthritis, GERD (gastroesophageal reflux disease), HTN (hypertension), Lupus, and Vertigo.  By history and examination  this patient has chronic right hip pain as well as low back pain with radiculopathy.  The underlying cause is hip osteoarthritis, degenerative disc disease and foraminal stenosis.  By history and exam of the bulk of her pain appears to be from the hip joint itself however I do feel like there is some contribution from her low back.  Lumbar MRI and hip x-rays were reviewed.  We discussed the underlying diagnoses and multiple treatment options including non-opioid medications, interventional procedures, physical therapy, home exercise, core muscle enhancement, and weight loss.  I will schedule the patient for a right intra-articular hip injection.  In the future she may also benefit from a lumbar epidural steroid injection as I feel this is also contributing to her pain.  The risks and benefits of each treatment option were discussed and all questions were answered.      05[birth year]03[birth year][id number]-ocqy-old female with a history of chronic right hip pain she also reported previously low back and radiculopathy symptoms.  Recently provided a right intra-articular hip injection on 04/17/2024 that provided her 99% relief her hip pain based on review of her recent images is related to hip osteoarthritis.  Her low back pain could be stemming from degenerative disc disease and foraminal stenosis.  For now we will hold off on any more injections considering her substantial relief with the right hip injection recommended she return to physical therapy as well as establish a home exercise plan.  Patient verbalized understanding and agreed she did inquire about repeating a right intra-articular hip injection prior to a vacation in November recommend that she could call the office to reschedule.    11/13/2024-Chinyere Najera is a 74 y.o. female who  has a past medical history of Arthritis, GERD (gastroesophageal reflux disease), HTN (hypertension), Lupus, and Vertigo.  By history and examination this patient has chronic low back pain with RIGHT  radiculopathy.  The underlying cause cause is facet arthritis, degenerative disc disease, foraminal stenosis, central canal stenosis, muscle dysfunction, and deconditioning.  Pathology is confirmed by imaging.  We discussed the underlying diagnoses and multiple treatment options including non-opioid medications, opioid medications, interventional procedures, physical therapy, home exercise, core muscle enhancement, activity modification, and weight loss.  The risks and benefits of each treatment option were discussed and all questions were answered.        12/17/2024-Chinyere Najera is a 74 y.o. female who  has a past medical history of Arthritis, GERD (gastroesophageal reflux disease), HTN (hypertension), Lupus, and Vertigo.  By history and examination this patient has chronic low back pain with radiculopathy.  The underlying cause cause is facet arthritis, degenerative disc disease, foraminal stenosis, central canal stenosis, and deconditioning.  Pathology is confirmed by imaging.  We discussed the underlying diagnoses and multiple treatment options including non-opioid medications, interventional procedures, physical therapy, home exercise, core muscle enhancement, activity modification, and weight loss.  The risks and benefits of each treatment option were discussed and all questions were answered.      01/29/2025-Chinyere Najera is a 74 y.o. female who  has a past medical history of Arthritis, GERD (gastroesophageal reflux disease), HTN (hypertension), Lupus, and Vertigo.  By history and examination this patient has chronic low back pain with RIGHT  radiculopathy.  The underlying cause cause is facet arthritis, degenerative disc disease, foraminal stenosis, central canal stenosis, and deconditioning.  Pathology is confirmed by imaging.  Patient also suffers from chronic right groin pain as she has osteoarthritis of the right hip and potentially will ultimately need a right hip replacement.  She does follow up with  orthopedics.  We discussed the underlying diagnoses and multiple treatment options including non-opioid medications, interventional procedures, physical therapy, home exercise, core muscle enhancement, activity modification, and weight loss.  The risks and benefits of each treatment option were discussed and all questions were answered.          Treatment Plan:   Procedures:  None at this time.  Can consider repeating right L4-5 and L5-S1 in the future.  PT/OT/HEP: I have stressed the importance of physical activity and a home exercise plan to help with pain and improve health.  Continue with home exercise regimen and physical therapy  Medications:  Start gabapentin 300 mg q.h.s. for chronic radiculopathy increase as tolerated.                          Patient did report receiving a recommendation from her rheumatologist to possibly take Advil/Tylenol mixture over-the-counter.   -  Reviewed and consistent with medication use as prescribed.  Imaging:  Previous imaging reviewed and discussed with the patient in detail using spine model and images from her chart.  Follow Up:  4 weeks virtually to discuss gabapentin    YANG Zamarripa  [facility]    Disclaimer: This note was partly generated using dictation software which may occasionally result in transcription errors.

## 2025-02-07 ENCOUNTER — PATIENT MESSAGE (OUTPATIENT)
Dept: PAIN MEDICINE | Facility: CLINIC | Age: 75
End: 2025-02-07
Payer: MEDICARE

## 2025-02-14 DIAGNOSIS — G89.4 CHRONIC PAIN SYNDROME: ICD-10-CM

## 2025-02-14 DIAGNOSIS — M51.362 DEGENERATION OF INTERVERTEBRAL DISC OF LUMBAR REGION WITH DISCOGENIC BACK PAIN AND LOWER EXTREMITY PAIN: ICD-10-CM

## 2025-02-14 DIAGNOSIS — M54.16 LUMBAR RADICULOPATHY: ICD-10-CM

## 2025-02-14 RX ORDER — GABAPENTIN 300 MG/1
300 CAPSULE ORAL 2 TIMES DAILY
Qty: 180 CAPSULE | Refills: 0 | Status: SHIPPED | OUTPATIENT
Start: 2025-02-14 | End: 2025-05-15

## 2025-02-27 ENCOUNTER — OFFICE VISIT (OUTPATIENT)
Dept: PAIN MEDICINE | Facility: CLINIC | Age: 75
End: 2025-02-27
Payer: MEDICARE

## 2025-02-27 DIAGNOSIS — M51.362 DEGENERATION OF INTERVERTEBRAL DISC OF LUMBAR REGION WITH DISCOGENIC BACK PAIN AND LOWER EXTREMITY PAIN: ICD-10-CM

## 2025-02-27 DIAGNOSIS — M54.16 LUMBAR RADICULOPATHY: Primary | ICD-10-CM

## 2025-02-27 DIAGNOSIS — G89.4 CHRONIC PAIN SYNDROME: ICD-10-CM

## 2025-02-27 RX ORDER — GABAPENTIN 300 MG/1
300 CAPSULE ORAL 3 TIMES DAILY
Qty: 270 CAPSULE | Refills: 0 | Status: SHIPPED | OUTPATIENT
Start: 2025-02-27 | End: 2025-05-28

## 2025-02-27 NOTE — PROGRESS NOTES
[facility] Patient Evaluation  telemedicine Encounter    Telemedicine Bundle:  The patient location is: patient's home  The chief complaint leading to consultation is: Low-back Pain (Axial ) and Leg Pain (Right )  Visit type: Virtual visit with synchronous audio and video  Total time spent with patient: 20 minutes   Each patient to whom he or she provides medical services by telemedicine is:    (1) informed of the relationship between the physician and patient and the respective role of any other health care provider with respect to management of the patient  (2) notified that he or she may decline to receive medical services by telemedicine and may withdraw from such care at any time.      Referred by: No ref. provider found   Reason for referral: * No diagnoses found *     CC:   Chief Complaint   Patient presents with    Low-back Pain     Axial     Leg Pain     Right          1/29/2025    10:08 AM 12/17/2024     8:49 AM 11/13/2024     8:42 AM   Last 3 PDI Scores   Pain Disability Index (PDI) 19 60 60     Interval History 02/27/2025  74-year-old female that presents for a follow up virtually, she presents to discuss recent changes in gabapentin medication also she is continuing to complain of low back pain right groin pain and right leg pain, her pain is exacerbated when lying down and performing ADLs.  She is established office in his previously provided a right L4-5 L5-S1 TF JUDIT in January 20, 2025 with 30-40% relief of her symptoms she does report 100% of her right foot numbness.  She denies any recent falls denies any profound weakness denies any bowel or bladder dysfunction at this time.  She does report continued pain that has disrupting her quality of life she is participating in physical therapy x3 per week.    Interval history 01/29/2025:  74-year-old female that presents for a follow-up appointment she is status post a right L4-5 and L5-S1 TF JUDIT on 01/15/2025  patient reports overall 30% relief of her symptoms she reports 100% relief of her right leg numbness, however she is still having pain in the anterior leg as well as right groin pain.  Patient denies any new pain denies any profound weakness denies any bowel or bladder dysfunction at this time her pain score today is 8/10.  She does still report increased pain with performing ADLs her groin pain is stemming from osteoarthritis of the right hip her low back and right leg pain is related to DDD and spinal stenosis.       Interval Update 12/17/2024: Patient return to clinic to discuss MRI results.  Patient reports continued pain in the low back with radiating symptoms into her right buttocks down her right lateral and posterior leg into her right foot.  Patient reports right leg pain accompanies low back pain consistently.  Patient denies any profound weakness denies any recent incident or trauma patient does report paresthesia like symptoms in the right leg she describes this as tingling in her right foot and right lower extremity).  Patient is established office and has a previously provided a right intra-articular hip injection with mild-to-moderate relief.    Interval Update 11/13/2024: Patient return to clinic SP right intra-articular hip injection procedure done on 10/30/2024 with 95% relief for 5 days and then the patient felt like the pain returned.  She is reporting new pain in the right buttocks radiating down her right leg and sometimes affecting her right foot.  She is reporting paresthesia like symptoms in the right leg she also feels like the pain in her right buttocks as sharp which does not allow her to sit on her right side she has to lean to her left.  She denies any recent incident or trauma denies any profound weakness.  She reports having experiences in the past but the intensity of it exacerbated following her right intra-articular hip injection on 10/30/2024.    Interval history 05/03/2024:  72  year female presents today for a follow-up appointment she has a history of chronic right hip pain she is status post a right intra-articular hip injection with Dr. Li on 04/17/2024  that provided her 99% relief of her symptoms and improvement in her functionality.  She denies any new pain denies profound weakness she acknowledges that she will return to a home exercise plan that includes aqua therapy.  She will also restart her physical therapy.      Subjective 04/02/24  Chinyere Najera is a 74 y.o. female who presents complaining of right hip pain.  The pain radiates to the groin into the right knee.  Pain is worse with standing, walking and daily activities.  She was previously undergone intra-articular right hip injections with excellent relief ranging anywhere from 8 months to 18 months.  She would like to repeat a hip injection.     Location: right hip pain   Onset: years, worse over last 6 months  Current Pain Score: 9/10  Daily Pain of Range: 9-10/10  Quality: Aching, Throbbing, Deep, and Sharp  Radiation:  Right groin and right knee  Worsened by: lying down, sitting, standing for more than a few minutes, walking for more than a few minutes, and daily activity.  Improved by: nothing    Patient denies night fever/night sweats, urinary incontinence, bowel incontinence, significant weight loss, significant motor weakness, and loss of sensations.    Previous Interventions:  -01/15/2025 Right  L4/5 and L5/S1 Lumbar TF JUDIT 30% right leg numbness 100% relieved  -10/30/2024 : Right Hip Joint Injection 95% relief for 5 days  -04/17/2024-right intra-articular hip injection 99% relief  - several right intra-articular hip injections with Orthopedics with relief ranging from 8-18 months    Previous Therapies:  PT/OT: yes   Chiropractor:   HEP:  Yes - physical therapy twice weekly and aquatic exercise up to 4 times weekly  Relevant Surgery: no   Previous Medications:   - NSAIDS:  Diclofenac  - Muscle Relaxants:    - TCAs:    - SNRIs:   - Topicals:   - Anticonvulsants:  Lyrica   - Opioids:   - Adjuvants:  Medrol Dosepak    Current Pain Medications:   Robaxin  Voltaren  Ibuprofen  Gabapentin 300 TID     Review of Systems:  Review of Systems   Musculoskeletal:  Positive for joint pain.       GENERAL:  No weight loss, malaise or fevers.  HEENT:   No recent changes in vision or hearing  NECK:  No difficulty with swallowing. No stridor.   RESPIRATORY:  Negative for cough, wheezing or shortness of breath, patient denies any recent URI.  CARDIOVASCULAR:  Negative for chest pain, leg swelling or palpitations.  GI:  Negative for abdominal discomfort, blood in stools or black stools or change in bowel habits.  MUSCULOSKELETAL:  See HPI.  SKIN:  Negative for lesions, rash, and itching.  PSYCH:  No mood disorder or recent psychosocial stressors.    HEMATOLOGY/LYMPHOLOGY:  Negative for prolonged bleeding, bruising easily or swollen nodes.  Patient is not currently taking any anti-coagulants  NEURO:   No history of headaches, syncope, paralysis, seizures or tremors.  All other reviewed and negative other than HPI.    History:  Current medications, allergies, medical history, surgical history,   family history, and social history were reviewed in the chart as marked.    Full Medication List:    Current Outpatient Medications:     albuterol (PROVENTIL/VENTOLIN HFA) 90 mcg/actuation inhaler, Inhale 2 puffs into the lungs., Disp: , Rfl:     alendronate (FOSAMAX) 70 MG tablet, Take 70 mg by mouth every 7 days., Disp: , Rfl:     amLODIPine (NORVASC) 5 MG tablet, Take 1 tablet by mouth once daily., Disp: , Rfl:     ascorbic acid, vitamin C, (VITAMIN C) 1000 MG tablet, Take 1,000 mg by mouth., Disp: , Rfl:     aspirin (ECOTRIN) 81 MG EC tablet, Take 81 mg by mouth once daily., Disp: , Rfl:     azelastine (ASTELIN) 137 mcg (0.1 %) nasal spray, 1 spray by Nasal route., Disp: , Rfl:     azilsartan med-chlorthalidone (EDARBYCLOR) 40-12.5 mg Tab, Take by mouth  once daily., Disp: , Rfl:     calcium-vitamin D3 (CALCIUM 500 + D) 500 mg(1,250mg) -200 unit per tablet, Take by mouth once daily. (Patient not taking: Reported on 1/29/2025), Disp: , Rfl:     diclofenac (VOLTAREN) 75 MG EC tablet, Take 75 mg by mouth 2 (two) times daily., Disp: , Rfl:     ergocalciferol (ERGOCALCIFEROL) 50,000 unit Cap, Take 50,000 Units by mouth., Disp: , Rfl:     esomeprazole (NEXIUM) 40 MG capsule, Take 40 mg by mouth before breakfast. (Patient not taking: Reported on 1/29/2025), Disp: , Rfl:     famotidine (PEPCID) 20 MG tablet, Take 20 mg by mouth., Disp: , Rfl:     fexofenadine (ALLEGRA) 180 MG tablet, Take 180 mg by mouth once daily., Disp: , Rfl:     gabapentin (NEURONTIN) 300 MG capsule, Take 1 capsule (300 mg total) by mouth 2 (two) times daily., Disp: 180 capsule, Rfl: 0    ibuprofen (ADVIL,MOTRIN) 800 MG tablet, Take 800 mg by mouth every 6 (six) hours as needed for Pain., Disp: , Rfl:     levocetirizine (XYZAL) 5 MG tablet, Take 10 mg by mouth., Disp: , Rfl:     methylPREDNISolone (MEDROL DOSEPACK) 4 mg tablet, use as directed, Disp: 1 each, Rfl: 1    mycophenolate (CELLCEPT) 500 mg Tab, Take by mouth., Disp: , Rfl:     olmesartan-hydrochlorothiazide (BENICAR HCT) 40-25 mg per tablet, Take 1 tablet by mouth., Disp: , Rfl:     potassium chloride (MICRO-K) 10 MEQ CpSR, Take 10 mEq by mouth once daily., Disp: , Rfl:     rosuvastatin (CRESTOR) 10 MG tablet, Take 10 mg by mouth., Disp: , Rfl:      Allergies:  Patient has no known allergies.     Medical History:   has a past medical history of Arthritis, GERD (gastroesophageal reflux disease), HTN (hypertension), Lupus, and Vertigo.    Surgical History:   has a past surgical history that includes Partial hysterectomy; Cholecystectomy; Injection of joint (Right, 4/17/2024); Injection of joint (Right, 10/30/2024); and Transforaminal epidural injection of steroid (Right, 1/15/2025).    Family History:  family history is not on file.    Social  History:   reports that she has never smoked. She does not have any smokeless tobacco history on file. She reports current alcohol use. She reports that she does not use drugs.    Physical Exam:  There were no vitals filed for this visit.    GEN: No acute distress. Calm, comfortable  HENT: Normocephalic, atraumatic, moist mucous membranes  EYE: Anicteric sclera, non-injected.   CV: Non-diaphoretic.   RESP: Breathing comfortably. Chest expansion symmetric.  PSYCH: Pleasant mood and appropriate affect. Recent and remote memory intact.         GENERAL: Well appearing, in no acute distress, alert and oriented x3.  PSYCH:  Mood and affect appropriate.  SKIN: Skin color, texture, turgor normal, no rashes or lesions.  HEAD/FACE:  Normocephalic, atraumatic. Cranial nerves grossly intact.  NECK: Normal ROM. Supple.   CV: RRR with palpation of the radial artery.  PULM: No evidence of respiratory difficulty, symmetric chest rise.  GI:  Soft and non-distended.  MSK: Straight leg raising is Positive on the Right to radicular pain. + pain to palpation over the facet joints of the lumbar spine. + pain with lumbar facet loading.  LAMAR test is positive for right hip pain. Pain with ROM of the Right Hip. Mild pain over the GBT bilaterally. No obvious deformities, edema, or skin discoloration.  No atrophy or tone abnormalities are noted.   NEURO: Bilateral upper and lower extremity coordination and strength is symmetric.  No loss of sensation is noted.  MENTAL STATUS: A x O x 3, good concentration, speech is fluent and goal directed  MOTOR: 5/5 in bilateral lower extremity muscle groups  GAIT:  Antalgic.  Ambulates unassisted.    Imaging:  EXAMINATION: MRI lumbar spine without contrast     EXAMINATION DATE: 12/20/2023     COMPARISON: None     TECHNIQUE: Multiplanar multisequence images of the lumbar spine were obtained without the use contrast.     INDICATION: Pain in both arms     FINDINGS:     5 lumbar bodies. The lordotic  curvature is normal. Grade 1 anterolisthesis of L4 and L5 without evidence for spondylolysis. Vertebral heights are maintained. Marrow signal pattern is normal. No evidence for compression informed, fracture, dislocation. Intervertebral disc space narrowing is identified at L4-L5 and L5-S1 with diffuse disc desiccation. Conus medullaris at the level of L1. The proximal spinal cord is normal caliber. The distal nerve roots are normal.     T12-L1: Small broad-based disc bulge with ligament flavum hypertrophy and degenerative changes facets. No evidence for central canal stenosis or neural foraminal narrowing.     L1-L2: Broad-based disc bulge with ligament flavum hypertrophy and degenerative changes facets. No evidence for central canal stenosis or neural foraminal narrowing.     L2-L3: Broad-based disc bulge with ligament flavum approach degenerative changes facets. Encroachment upon the left lateral recess is identified with impingement transcending L3 nerve root. No significant neural foraminal narrowing or central canal stenosis.     L3-L4: Broad-based disc bulge with ligament flavum hypertrophy and degenerative changes facets. Involvement of bilateral lateral recesses greater on the left right with impingement transcending L4 nerve roots. No evidence for central canal stenosis. Minimal neural foraminal narrowing on the right.     L4-L5: Exaggerated broad-based disc bulge with ligament flavum hypertrophy and degenerative changes facets causing minimal moderate central canal stenosis. Involvement of the bilateral lateral recesses with impingement of the transcending L5 nerve roots. Moderate bilateral neural foraminal narrowing.     L5-S1: Central disc protrusion with effacement of ventral CSF. No evidence for central canal stenosis. Involvement of bilateral lateral recesses with impingement of the transcending S1 nerve roots. Minimal neural foraminal narrowing.     Degenerative changes of the SI joints. The  visualized hollow and solid viscera grossly normal         XR HIPS BILATERAL 2 VIEW INCL AP PELVIS     CLINICAL HISTORY:  Pain in right hip     FINDINGS:  Bilateral two views hips to include pelvis.     Right: No fracture dislocation bone destruction seen.     Left: No fracture dislocation bone destruction seen.        Electronically signed by: Oziel Aranda MD  Date:                                            12/15/2023    Labs:  BMP  Lab Results   Component Value Date     01/20/2025    K 3.5 01/20/2025     09/01/2016    CO2 28 01/20/2025    BUN 13 01/20/2025    CREATININE 0.63 01/20/2025    CALCIUM 9.6 01/20/2025    EGFRNORACEVR 94 01/20/2025     Lab Results   Component Value Date    ALT 13 01/20/2025    AST 18 01/20/2025    ALKPHOS 86 01/20/2025    BILITOT 0.4 01/20/2025     Lab Results   Component Value Date    WBC 6.8 09/01/2016    HGB 12.0 09/01/2016    HCT 38.5 09/01/2016    MCV 82.2 09/01/2016     09/01/2016         Assessment:  Problem List Items Addressed This Visit    None        04/02/2024 - Chinyere Najera is a 74 y.o. female who  has a past medical history of Arthritis, GERD (gastroesophageal reflux disease), HTN (hypertension), Lupus, and Vertigo.  By history and examination this patient has chronic right hip pain as well as low back pain with radiculopathy.  The underlying cause is hip osteoarthritis, degenerative disc disease and foraminal stenosis.  By history and exam of the bulk of her pain appears to be from the hip joint itself however I do feel like there is some contribution from her low back.  Lumbar MRI and hip x-rays were reviewed.  We discussed the underlying diagnoses and multiple treatment options including non-opioid medications, interventional procedures, physical therapy, home exercise, core muscle enhancement, and weight loss.  I will schedule the patient for a right intra-articular hip injection.  In the future she may also benefit from a lumbar epidural steroid  injection as I feel this is also contributing to her pain.  The risks and benefits of each treatment option were discussed and all questions were answered.      05/03/[id number]-opqf-old female with a history of chronic right hip pain she also reported previously low back and radiculopathy symptoms.  Recently provided a right intra-articular hip injection on 04/17/2024 that provided her 99% relief her hip pain based on review of her recent images is related to hip osteoarthritis.  Her low back pain could be stemming from degenerative disc disease and foraminal stenosis.  For now we will hold off on any more injections considering her substantial relief with the right hip injection recommended she return to physical therapy as well as establish a home exercise plan.  Patient verbalized understanding and agreed she did inquire about repeating a right intra-articular hip injection prior to a vacation in November recommend that she could call the office to reschedule.    11/13/2024-Chinyere Najera is a 74 y.o. female who  has a past medical history of Arthritis, GERD (gastroesophageal reflux disease), HTN (hypertension), Lupus, and Vertigo.  By history and examination this patient has chronic low back pain with RIGHT radiculopathy.  The underlying cause cause is facet arthritis, degenerative disc disease, foraminal stenosis, central canal stenosis, muscle dysfunction, and deconditioning.  Pathology is confirmed by imaging.  We discussed the underlying diagnoses and multiple treatment options including non-opioid medications, opioid medications, interventional procedures, physical therapy, home exercise, core muscle enhancement, activity modification, and weight loss.  The risks and benefits of each treatment option were discussed and all questions were answered.        12/17/2024-Chinyere Najera is a 74 y.o. female who  has a past medical history of Arthritis, GERD (gastroesophageal reflux disease), HTN (hypertension), Lupus, and  Vertigo.  By history and examination this patient has chronic low back pain with radiculopathy.  The underlying cause cause is facet arthritis, degenerative disc disease, foraminal stenosis, central canal stenosis, and deconditioning.  Pathology is confirmed by imaging.  We discussed the underlying diagnoses and multiple treatment options including non-opioid medications, interventional procedures, physical therapy, home exercise, core muscle enhancement, activity modification, and weight loss.  The risks and benefits of each treatment option were discussed and all questions were answered.      01/29/2025-Chinyere Najera is a 74 y.o. female who  has a past medical history of Arthritis, GERD (gastroesophageal reflux disease), HTN (hypertension), Lupus, and Vertigo.  By history and examination this patient has chronic low back pain with RIGHT  radiculopathy.  The underlying cause cause is facet arthritis, degenerative disc disease, foraminal stenosis, central canal stenosis, and deconditioning.  Pathology is confirmed by imaging.  Patient also suffers from chronic right groin pain as she has osteoarthritis of the right hip and potentially will ultimately need a right hip replacement.  She does follow up with orthopedics.  We discussed the underlying diagnoses and multiple treatment options including non-opioid medications, interventional procedures, physical therapy, home exercise, core muscle enhancement, activity modification, and weight loss.  The risks and benefits of each treatment option were discussed and all questions were answered.      02/27/[id number]-rril-old female with a history of chronic low back pain with right radiculopathy.  Her lumbar MRI is significant for appropriate disc bulge at L3-4 there is involvement bilaterally of the lateral recess greater on the left than right with impingement descending L4 nerve roots.  L4-5 exaggerated broad-based disc bulge facet changes involvement of the bilateral recess  with impingement of the trend sending L5 nerve root she also has moderate bilateral neural foraminal narrowing at this level and finally L5-S1 a central disc protrusion there is involvement of bilateral recess with impingement of the transiting S1 nerve root.  This would explain her continued low back and right leg pain.  Her right groin pain is likely related to osteoarthritis of the right hip she is a candidate for right hip replacement.  For now we will continue to optimize medications such as gabapentin and Tylenol in effort to reduce some of her symptoms we will hold off on any pain interventions at this time.  We discussed the underlying diagnosis and all the treatment options patient verbalized understanding.        Treatment Plan:   Procedures:  None at this time.  Can consider repeating right L4-5 and L5-S1 in the future.  PT/OT/HEP: I have stressed the importance of physical activity and a home exercise plan to help with pain and improve health.  Continue with home exercise regimen and physical therapy  Medications:  Continue and increase gabapentin 300 mg BID to TID  for chronic radiculopathy increase as tolerated.                          Start tylenol 1000 mg TID not to exceed 3000 mg                           Patient did report receiving a recommendation from her rheumatologist to possibly take Advil/Tylenol mixture over-the-counter.   -  Reviewed and consistent with medication use as prescribed.  Imaging:  Previous imaging reviewed and discussed with the patient in detail using spine model and images from her chart.  Follow Up:  4 weeks virtually to discuss gabapentin    YANG Zamarripa  [facility] Management    Disclaimer: This note was partly generated using dictation software which may occasionally result in transcription errors.

## 2025-04-03 ENCOUNTER — OFFICE VISIT (OUTPATIENT)
Dept: PAIN MEDICINE | Facility: CLINIC | Age: 75
End: 2025-04-03
Payer: MEDICARE

## 2025-04-03 DIAGNOSIS — M47.816 LUMBAR SPONDYLOSIS: ICD-10-CM

## 2025-04-03 DIAGNOSIS — M25.551 PAIN OF RIGHT HIP: ICD-10-CM

## 2025-04-03 DIAGNOSIS — M16.11 PRIMARY OSTEOARTHRITIS OF RIGHT HIP: ICD-10-CM

## 2025-04-03 DIAGNOSIS — M16.11 OSTEOARTHRITIS OF RIGHT HIP, UNSPECIFIED OSTEOARTHRITIS TYPE: ICD-10-CM

## 2025-04-03 DIAGNOSIS — M54.16 LUMBAR RADICULOPATHY, ACUTE: Primary | ICD-10-CM

## 2025-04-03 NOTE — PROGRESS NOTES
[facility] Patient Evaluation  telemedicine Encounter    Telemedicine Bundle:  The patient location is: patient's home  The chief complaint leading to consultation is: Joint Pain, Hip Pain (Right ), Low-back Pain, and Leg Pain  Visit type: Virtual visit with synchronous audio and video  Total time spent with patient: 20 minutes   Each patient to whom he or she provides medical services by telemedicine is:    (1) informed of the relationship between the physician and patient and the respective role of any other health care provider with respect to management of the patient  (2) notified that he or she may decline to receive medical services by telemedicine and may withdraw from such care at any time.      Referred by: Aaareferral Self   Reason for referral: * No diagnoses found *     CC:   Chief Complaint   Patient presents with    Joint Pain    Hip Pain     Right     Low-back Pain    Leg Pain         1/29/2025    10:08 AM 12/17/2024     8:49 AM 11/13/2024     8:42 AM   Last 3 PDI Scores   Pain Disability Index (PDI) 19 60 60     Interval History 4/3/2025:  74 year female that presents virtually for a follow-up appointment she has a history of chronic low back pain with bilateral leg pain she also has a history of right hip pain she is scheduled for an upcoming right hip replacement on 04/08/2025 with Dr. Lovett.  Patient does report continued pain in the low back with radiating symptoms I did optimize her gabapentin last clinic visit increasing it to gabapentin 300 mg t.i.d. she denies any adverse side effects with this increase in does state that it is helping.  She will focus on moving forward with her right hip replacement surgery and then follow up with our office following she denies any new pain denies any profound weakness denies any bowel or bladder dysfunction at this time.        Interval History 02/27/2025  74-year-old female that presents for a follow up  virtually, she presents to discuss recent changes in gabapentin medication also she is continuing to complain of low back pain right groin pain and right leg pain, her pain is exacerbated when lying down and performing ADLs.  She is established office in his previously provided a right L4-5 L5-S1 TF JUDIT in January 20, 2025 with 30-40% relief of her symptoms she does report 100% of her right foot numbness.  She denies any recent falls denies any profound weakness denies any bowel or bladder dysfunction at this time.  She does report continued pain that has disrupting her quality of life she is participating in physical therapy x3 per week.    Interval history 01/29/2025:  74-year-old female that presents for a follow-up appointment she is status post a right L4-5 and L5-S1 TF JUDIT on 01/15/2025 patient reports overall 30% relief of her symptoms she reports 100% relief of her right leg numbness, however she is still having pain in the anterior leg as well as right groin pain.  Patient denies any new pain denies any profound weakness denies any bowel or bladder dysfunction at this time her pain score today is 8/10.  She does still report increased pain with performing ADLs her groin pain is stemming from osteoarthritis of the right hip her low back and right leg pain is related to DDD and spinal stenosis.       Interval Update 12/17/2024: Patient return to clinic to discuss MRI results.  Patient reports continued pain in the low back with radiating symptoms into her right buttocks down her right lateral and posterior leg into her right foot.  Patient reports right leg pain accompanies low back pain consistently.  Patient denies any profound weakness denies any recent incident or trauma patient does report paresthesia like symptoms in the right leg she describes this as tingling in her right foot and right lower extremity).  Patient is established office and has a previously provided a right intra-articular hip  injection with mild-to-moderate relief.    Interval Update 11/13/2024: Patient return to clinic SP right intra-articular hip injection procedure done on 10/30/2024 with 95% relief for 5 days and then the patient felt like the pain returned.  She is reporting new pain in the right buttocks radiating down her right leg and sometimes affecting her right foot.  She is reporting paresthesia like symptoms in the right leg she also feels like the pain in her right buttocks as sharp which does not allow her to sit on her right side she has to lean to her left.  She denies any recent incident or trauma denies any profound weakness.  She reports having experiences in the past but the intensity of it exacerbated following her right intra-articular hip injection on 10/30/2024.    Interval history 05/03/2024:  72 year female presents today for a follow-up appointment she has a history of chronic right hip pain she is status post a right intra-articular hip injection with Dr. Li on 04/17/2024  that provided her 99% relief of her symptoms and improvement in her functionality.  She denies any new pain denies profound weakness she acknowledges that she will return to a home exercise plan that includes aqua therapy.  She will also restart her physical therapy.      Subjective 04/02/24  Chinyere Najera is a 74 y.o. female who presents complaining of right hip pain.  The pain radiates to the groin into the right knee.  Pain is worse with standing, walking and daily activities.  She was previously undergone intra-articular right hip injections with excellent relief ranging anywhere from 8 months to 18 months.  She would like to repeat a hip injection.     Location: right hip pain   Onset: years, worse over last 6 months  Current Pain Score: 9/10  Daily Pain of Range: 9-10/10  Quality: Aching, Throbbing, Deep, and Sharp  Radiation:  Right groin and right knee  Worsened by: lying down, sitting, standing for more than a few minutes,  walking for more than a few minutes, and daily activity.  Improved by: nothing    Patient denies night fever/night sweats, urinary incontinence, bowel incontinence, significant weight loss, significant motor weakness, and loss of sensations.    Previous Interventions:  -01/15/2025 Right  L4/5 and L5/S1 Lumbar TF JUDIT 30% right leg numbness 100% relieved  -10/30/2024 : Right Hip Joint Injection 95% relief for 5 days  -04/17/2024-right intra-articular hip injection 99% relief  - several right intra-articular hip injections with Orthopedics with relief ranging from 8-18 months    Previous Therapies:  PT/OT: yes   Chiropractor:   HEP:  Yes - physical therapy twice weekly and aquatic exercise up to 4 times weekly  Relevant Surgery: no   Previous Medications:   - NSAIDS:  Diclofenac  - Muscle Relaxants:    - TCAs:   - SNRIs:   - Topicals:   - Anticonvulsants:  Lyrica   - Opioids:   - Adjuvants:  Medrol Dosepak    Current Pain Medications:   Robaxin  Voltaren  Ibuprofen  Gabapentin 300 TID     Review of Systems:  Review of Systems   Musculoskeletal:  Positive for joint pain.       GENERAL:  No weight loss, malaise or fevers.  HEENT:   No recent changes in vision or hearing  NECK:  No difficulty with swallowing. No stridor.   RESPIRATORY:  Negative for cough, wheezing or shortness of breath, patient denies any recent URI.  CARDIOVASCULAR:  Negative for chest pain, leg swelling or palpitations.  GI:  Negative for abdominal discomfort, blood in stools or black stools or change in bowel habits.  MUSCULOSKELETAL:  See HPI.  SKIN:  Negative for lesions, rash, and itching.  PSYCH:  No mood disorder or recent psychosocial stressors.    HEMATOLOGY/LYMPHOLOGY:  Negative for prolonged bleeding, bruising easily or swollen nodes.  Patient is not currently taking any anti-coagulants  NEURO:   No history of headaches, syncope, paralysis, seizures or tremors.  All other reviewed and negative other than HPI.    History:  Current  medications, allergies, medical history, surgical history,   family history, and social history were reviewed in the chart as marked.    Full Medication List:    Current Outpatient Medications:     albuterol (PROVENTIL/VENTOLIN HFA) 90 mcg/actuation inhaler, Inhale 2 puffs into the lungs., Disp: , Rfl:     alendronate (FOSAMAX) 70 MG tablet, Take 70 mg by mouth every 7 days., Disp: , Rfl:     amLODIPine (NORVASC) 5 MG tablet, Take 1 tablet by mouth once daily., Disp: , Rfl:     ascorbic acid, vitamin C, (VITAMIN C) 1000 MG tablet, Take 1,000 mg by mouth., Disp: , Rfl:     aspirin (ECOTRIN) 81 MG EC tablet, Take 81 mg by mouth once daily., Disp: , Rfl:     azelastine (ASTELIN) 137 mcg (0.1 %) nasal spray, 1 spray by Nasal route., Disp: , Rfl:     azilsartan med-chlorthalidone (EDARBYCLOR) 40-12.5 mg Tab, Take by mouth once daily., Disp: , Rfl:     calcium-vitamin D3 (CALCIUM 500 + D) 500 mg(1,250mg) -200 unit per tablet, Take by mouth once daily. (Patient not taking: Reported on 1/29/2025), Disp: , Rfl:     diclofenac (VOLTAREN) 75 MG EC tablet, Take 75 mg by mouth 2 (two) times daily., Disp: , Rfl:     ergocalciferol (ERGOCALCIFEROL) 50,000 unit Cap, Take 50,000 Units by mouth., Disp: , Rfl:     esomeprazole (NEXIUM) 40 MG capsule, Take 40 mg by mouth before breakfast. (Patient not taking: Reported on 1/29/2025), Disp: , Rfl:     famotidine (PEPCID) 20 MG tablet, Take 20 mg by mouth., Disp: , Rfl:     fexofenadine (ALLEGRA) 180 MG tablet, Take 180 mg by mouth once daily., Disp: , Rfl:     gabapentin (NEURONTIN) 300 MG capsule, Take 1 capsule (300 mg total) by mouth 3 (three) times daily., Disp: 270 capsule, Rfl: 0    ibuprofen (ADVIL,MOTRIN) 800 MG tablet, Take 800 mg by mouth every 6 (six) hours as needed for Pain., Disp: , Rfl:     levocetirizine (XYZAL) 5 MG tablet, Take 10 mg by mouth., Disp: , Rfl:     methylPREDNISolone (MEDROL DOSEPACK) 4 mg tablet, use as directed, Disp: 1 each, Rfl: 1    mycophenolate  (CELLCEPT) 500 mg Tab, Take by mouth., Disp: , Rfl:     olmesartan-hydrochlorothiazide (BENICAR HCT) 40-25 mg per tablet, Take 1 tablet by mouth., Disp: , Rfl:     potassium chloride (MICRO-K) 10 MEQ CpSR, Take 10 mEq by mouth once daily., Disp: , Rfl:     rosuvastatin (CRESTOR) 10 MG tablet, Take 10 mg by mouth., Disp: , Rfl:      Allergies:  Patient has no known allergies.     Medical History:   has a past medical history of Arthritis, GERD (gastroesophageal reflux disease), HTN (hypertension), Lupus, and Vertigo.    Surgical History:   has a past surgical history that includes Partial hysterectomy; Cholecystectomy; Injection of joint (Right, 4/17/2024); Injection of joint (Right, 10/30/2024); and Transforaminal epidural injection of steroid (Right, 1/15/2025).    Family History:  family history is not on file.    Social History:   reports that she has never smoked. She does not have any smokeless tobacco history on file. She reports current alcohol use. She reports that she does not use drugs.    Physical Exam:  There were no vitals filed for this visit.    GEN: No acute distress. Calm, comfortable  HENT: Normocephalic, atraumatic, moist mucous membranes  EYE: Anicteric sclera, non-injected.   CV: Non-diaphoretic.   RESP: Breathing comfortably. Chest expansion symmetric.  PSYCH: Pleasant mood and appropriate affect. Recent and remote memory intact.         GENERAL: Well appearing, in no acute distress, alert and oriented x3.  PSYCH:  Mood and affect appropriate.  SKIN: Skin color, texture, turgor normal, no rashes or lesions.  HEAD/FACE:  Normocephalic, atraumatic. Cranial nerves grossly intact.  NECK: Normal ROM. Supple.   CV: RRR with palpation of the radial artery.  PULM: No evidence of respiratory difficulty, symmetric chest rise.  GI:  Soft and non-distended.  MSK: Straight leg raising is Positive on the Right to radicular pain. + pain to palpation over the facet joints of the lumbar spine. + pain with  lumbar facet loading.  LAMAR test is positive for right hip pain. Pain with ROM of the Right Hip. Mild pain over the GBT bilaterally. No obvious deformities, edema, or skin discoloration.  No atrophy or tone abnormalities are noted.   NEURO: Bilateral upper and lower extremity coordination and strength is symmetric.  No loss of sensation is noted.  MENTAL STATUS: A x O x 3, good concentration, speech is fluent and goal directed  MOTOR: 5/5 in bilateral lower extremity muscle groups  GAIT:  Antalgic.  Ambulates unassisted.    Imaging:  EXAMINATION: MRI lumbar spine without contrast     EXAMINATION DATE: 12/20/2023     COMPARISON: None     TECHNIQUE: Multiplanar multisequence images of the lumbar spine were obtained without the use contrast.     INDICATION: Pain in both arms     FINDINGS:     5 lumbar bodies. The lordotic curvature is normal. Grade 1 anterolisthesis of L4 and L5 without evidence for spondylolysis. Vertebral heights are maintained. Marrow signal pattern is normal. No evidence for compression informed, fracture, dislocation. Intervertebral disc space narrowing is identified at L4-L5 and L5-S1 with diffuse disc desiccation. Conus medullaris at the level of L1. The proximal spinal cord is normal caliber. The distal nerve roots are normal.     T12-L1: Small broad-based disc bulge with ligament flavum hypertrophy and degenerative changes facets. No evidence for central canal stenosis or neural foraminal narrowing.     L1-L2: Broad-based disc bulge with ligament flavum hypertrophy and degenerative changes facets. No evidence for central canal stenosis or neural foraminal narrowing.     L2-L3: Broad-based disc bulge with ligament flavum approach degenerative changes facets. Encroachment upon the left lateral recess is identified with impingement transcending L3 nerve root. No significant neural foraminal narrowing or central canal stenosis.     L3-L4: Broad-based disc bulge with ligament flavum hypertrophy  and degenerative changes facets. Involvement of bilateral lateral recesses greater on the left right with impingement transcending L4 nerve roots. No evidence for central canal stenosis. Minimal neural foraminal narrowing on the right.     L4-L5: Exaggerated broad-based disc bulge with ligament flavum hypertrophy and degenerative changes facets causing minimal moderate central canal stenosis. Involvement of the bilateral lateral recesses with impingement of the transcending L5 nerve roots. Moderate bilateral neural foraminal narrowing.     L5-S1: Central disc protrusion with effacement of ventral CSF. No evidence for central canal stenosis. Involvement of bilateral lateral recesses with impingement of the transcending S1 nerve roots. Minimal neural foraminal narrowing.     Degenerative changes of the SI joints. The visualized hollow and solid viscera grossly normal         XR HIPS BILATERAL 2 VIEW INCL AP PELVIS     CLINICAL HISTORY:  Pain in right hip     FINDINGS:  Bilateral two views hips to include pelvis.     Right: No fracture dislocation bone destruction seen.     Left: No fracture dislocation bone destruction seen.        Electronically signed by: Oziel Aranda MD  Date:                                            12/15/2023    Labs:  BMP  Lab Results   Component Value Date     03/12/2025    K 3.7 03/12/2025     09/01/2016    CO2 25 03/12/2025    BUN 13.3 03/12/2025    CREATININE 0.71 03/12/2025    CALCIUM 9.3 03/12/2025    ANIONGAP 10 03/12/2025    EGFRNORACEVR 90 03/12/2025     Lab Results   Component Value Date    ALT 13 01/20/2025    AST 18 01/20/2025    ALKPHOS 86 01/20/2025    BILITOT 0.4 01/20/2025     Lab Results   Component Value Date    WBC 6.8 09/01/2016    HGB 12.0 09/01/2016    HCT 38.5 09/01/2016    MCV 82.2 09/01/2016     09/01/2016         Assessment:  Problem List Items Addressed This Visit    None        04/02/2024 - Chinyere Najera is a 74 y.o. female who  has a past medical  history of Arthritis, GERD (gastroesophageal reflux disease), HTN (hypertension), Lupus, and Vertigo.  By history and examination this patient has chronic right hip pain as well as low back pain with radiculopathy.  The underlying cause is hip osteoarthritis, degenerative disc disease and foraminal stenosis.  By history and exam of the bulk of her pain appears to be from the hip joint itself however I do feel like there is some contribution from her low back.  Lumbar MRI and hip x-rays were reviewed.  We discussed the underlying diagnoses and multiple treatment options including non-opioid medications, interventional procedures, physical therapy, home exercise, core muscle enhancement, and weight loss.  I will schedule the patient for a right intra-articular hip injection.  In the future she may also benefit from a lumbar epidural steroid injection as I feel this is also contributing to her pain.  The risks and benefits of each treatment option were discussed and all questions were answered.      05/03/[id number]-olqf-old female with a history of chronic right hip pain she also reported previously low back and radiculopathy symptoms.  Recently provided a right intra-articular hip injection on 04/17/2024 that provided her 99% relief her hip pain based on review of her recent images is related to hip osteoarthritis.  Her low back pain could be stemming from degenerative disc disease and foraminal stenosis.  For now we will hold off on any more injections considering her substantial relief with the right hip injection recommended she return to physical therapy as well as establish a home exercise plan.  Patient verbalized understanding and agreed she did inquire about repeating a right intra-articular hip injection prior to a vacation in November recommend that she could call the office to reschedule.    11/13/2024-Chinyere Najera is a 74 y.o. female who  has a past medical history of Arthritis, GERD (gastroesophageal reflux  disease), HTN (hypertension), Lupus, and Vertigo.  By history and examination this patient has chronic low back pain with RIGHT radiculopathy.  The underlying cause cause is facet arthritis, degenerative disc disease, foraminal stenosis, central canal stenosis, muscle dysfunction, and deconditioning.  Pathology is confirmed by imaging.  We discussed the underlying diagnoses and multiple treatment options including non-opioid medications, opioid medications, interventional procedures, physical therapy, home exercise, core muscle enhancement, activity modification, and weight loss.  The risks and benefits of each treatment option were discussed and all questions were answered.        12/17/2024Topher Najera is a 74 y.o. female who  has a past medical history of Arthritis, GERD (gastroesophageal reflux disease), HTN (hypertension), Lupus, and Vertigo.  By history and examination this patient has chronic low back pain with radiculopathy.  The underlying cause cause is facet arthritis, degenerative disc disease, foraminal stenosis, central canal stenosis, and deconditioning.  Pathology is confirmed by imaging.  We discussed the underlying diagnoses and multiple treatment options including non-opioid medications, interventional procedures, physical therapy, home exercise, core muscle enhancement, activity modification, and weight loss.  The risks and benefits of each treatment option were discussed and all questions were answered.      01/29/2025Topher Najera is a 74 y.o. female who  has a past medical history of Arthritis, GERD (gastroesophageal reflux disease), HTN (hypertension), Lupus, and Vertigo.  By history and examination this patient has chronic low back pain with RIGHT  radiculopathy.  The underlying cause cause is facet arthritis, degenerative disc disease, foraminal stenosis, central canal stenosis, and deconditioning.  Pathology is confirmed by imaging.  Patient also suffers from chronic right groin pain as she  has osteoarthritis of the right hip and potentially will ultimately need a right hip replacement.  She does follow up with orthopedics.  We discussed the underlying diagnoses and multiple treatment options including non-opioid medications, interventional procedures, physical therapy, home exercise, core muscle enhancement, activity modification, and weight loss.  The risks and benefits of each treatment option were discussed and all questions were answered.      02/27/[id number]-gnbt-old female with a history of chronic low back pain with right radiculopathy.  Her lumbar MRI is significant for appropriate disc bulge at L3-4 there is involvement bilaterally of the lateral recess greater on the left than right with impingement descending L4 nerve roots.  L4-5 exaggerated broad-based disc bulge facet changes involvement of the bilateral recess with impingement of the trend sending L5 nerve root she also has moderate bilateral neural foraminal narrowing at this level and finally L5-S1 a central disc protrusion there is involvement of bilateral recess with impingement of the transiting S1 nerve root.  This would explain her continued low back and right leg pain.  Her right groin pain is likely related to osteoarthritis of the right hip she is a candidate for right hip replacement.  For now we will continue to optimize medications such as gabapentin and Tylenol in effort to reduce some of her symptoms we will hold off on any pain interventions at this time.  We discussed the underlying diagnosis and all the treatment options patient verbalized understanding.    04/03/2025-Chinyere Najera is a 74 y.o. female who  has a past medical history of Arthritis, GERD (gastroesophageal reflux disease), HTN (hypertension), Lupus, and Vertigo.  By history and examination this patient has chronic low back pain with radiculopathy. She also has Hx of chronic right hip pain.  The underlying cause cause is facet arthritis, degenerative disc  disease, foraminal stenosis, central canal stenosis, muscle dysfunction, muscles strain, and deconditioning.  Pathology is confirmed by imaging.  We discussed the underlying diagnoses and multiple treatment options including non-opioid medications, interventional procedures, physical therapy, home exercise, core muscle enhancement, activity modification, and weight loss.  The risks and benefits of each treatment option were discussed and all questions were answered.        Treatment Plan:   Procedures:  None at this time.  Can consider repeating right L4-5 and L5-S1 in the future. Patient s/f a upcoming Right hip replacement.   PT/OT/HEP: I have stressed the importance of physical activity and a home exercise plan to help with pain and improve health.  Continue with home exercise regimen and physical therapy  Medications:  Continue Gabapentin 300 TID  for chronic radiculopathy increase as tolerated.                          Continue tylenol 1000 mg TID not to exceed 3000 mg                           Patient did report receiving a recommendation from her rheumatologist to possibly take Advil/Tylenol mixture over-the-counter.   -  Reviewed and consistent with medication use as prescribed.  Imaging:  Previous imaging reviewed and discussed with the patient in detail using spine model and images from her chart.  Follow Up: 3-4 months or sooner for ongoing care.    YANG Zamarripa  [facility]    Disclaimer: This note was partly generated using dictation software which may occasionally result in transcription errors.

## 2025-06-30 DIAGNOSIS — M51.362 DEGENERATION OF INTERVERTEBRAL DISC OF LUMBAR REGION WITH DISCOGENIC BACK PAIN AND LOWER EXTREMITY PAIN: ICD-10-CM

## 2025-06-30 DIAGNOSIS — M54.16 LUMBAR RADICULOPATHY: ICD-10-CM

## 2025-06-30 DIAGNOSIS — G89.4 CHRONIC PAIN SYNDROME: ICD-10-CM

## 2025-06-30 RX ORDER — GABAPENTIN 300 MG/1
300 CAPSULE ORAL 3 TIMES DAILY
Qty: 90 CAPSULE | Refills: 0 | Status: SHIPPED | OUTPATIENT
Start: 2025-06-30 | End: 2025-07-30